# Patient Record
Sex: FEMALE | Race: WHITE | NOT HISPANIC OR LATINO | Employment: OTHER | URBAN - METROPOLITAN AREA
[De-identification: names, ages, dates, MRNs, and addresses within clinical notes are randomized per-mention and may not be internally consistent; named-entity substitution may affect disease eponyms.]

---

## 2020-03-04 ENCOUNTER — APPOINTMENT (OUTPATIENT)
Dept: RADIOLOGY | Facility: CLINIC | Age: 65
End: 2020-03-04
Payer: MEDICARE

## 2020-03-04 ENCOUNTER — OFFICE VISIT (OUTPATIENT)
Dept: OBGYN CLINIC | Facility: CLINIC | Age: 65
End: 2020-03-04
Payer: MEDICARE

## 2020-03-04 VITALS
SYSTOLIC BLOOD PRESSURE: 138 MMHG | DIASTOLIC BLOOD PRESSURE: 80 MMHG | HEART RATE: 82 BPM | BODY MASS INDEX: 31.86 KG/M2 | HEIGHT: 67 IN | WEIGHT: 203 LBS

## 2020-03-04 DIAGNOSIS — M25.561 RIGHT KNEE PAIN, UNSPECIFIED CHRONICITY: ICD-10-CM

## 2020-03-04 DIAGNOSIS — M23.91 INTERNAL DERANGEMENT OF RIGHT KNEE: Primary | ICD-10-CM

## 2020-03-04 DIAGNOSIS — M25.461 EFFUSION OF RIGHT KNEE: ICD-10-CM

## 2020-03-04 PROCEDURE — 99203 OFFICE O/P NEW LOW 30 MIN: CPT | Performed by: ORTHOPAEDIC SURGERY

## 2020-03-04 PROCEDURE — 73564 X-RAY EXAM KNEE 4 OR MORE: CPT

## 2020-03-04 RX ORDER — FOLIC ACID 0.8 MG
TABLET ORAL DAILY
COMMUNITY

## 2020-03-04 RX ORDER — GLUCOSAMINE/CHONDR SU A SOD 750-600 MG
TABLET ORAL DAILY
COMMUNITY

## 2020-03-04 NOTE — PROGRESS NOTES
Assessment:     1  Internal derangement of right knee    2  Effusion of right knee    3  Right knee pain, unspecified chronicity        Plan:     Problem List Items Addressed This Visit        Musculoskeletal and Integument    Internal derangement of right knee - Primary    Relevant Orders    MRI knee right  wo contrast    Effusion of right knee    Relevant Orders    MRI knee right  wo contrast      Other Visit Diagnoses     Right knee pain, unspecified chronicity        Relevant Orders    XR knee 4+ vw right injury          Findings consistent with right knee pain with effusion, suspect lateral meniscus tear  Discussed findings and treatment options with the patient  I reviewed patient's right knee x-ray with her  I discussed with patient possible cause of her knee pain  I am concerned the patient may have a lateral meniscus tear  I recommended MRI of her right knee to better assess the meniscus  I discussed with patient possible surgical interventions if the meniscus is torn and she continued to have mechanical symptoms  I advised patient to avoid activities that will aggravate her knee  I will see patient back after the her right knee MRI  All patient's questions were answered to her satisfaction  This note is created using dictation transcription  It may contain typographical errors, grammatical errors, improperly dictated words, background noise and other errors  Subjective:     Patient ID: Duong Ferreira is a 75 y o  female  Chief Complaint:  58-year-old female with new onset of right knee pain started 2/23/2020  Patient's symptoms started after running across parking lot to get to her car since it was raining  She denies sudden onset of pain in her right knee  She started experiencing pain in her right knee that evening  Pain got worse in the morning  She is experiencing sharp pain along the outer aspect of her knee with twisting and walking    She has difficulty walking up and down the steps   Squatting and pivoting with the knee is also painful  She denies locking or giving way sensations  She also has pain in the back of her knee that seems to radiate into her calf  She denies problem with her right knee prior to onset of her pain  Information on patient's intake form was reviewed  Allergy:  No Known Allergies  Medications:  all current active meds have been reviewed  Past Medical History:  History reviewed  No pertinent past medical history  Past Surgical History:  Past Surgical History:   Procedure Laterality Date    HYSTERECTOMY  1999     Family History:  Family History   Problem Relation Age of Onset    Diabetes Father      Social History:  Social History     Substance and Sexual Activity   Alcohol Use Not on file    Comment: rare     Social History     Substance and Sexual Activity   Drug Use Not on file     Social History     Tobacco Use   Smoking Status Never Smoker   Smokeless Tobacco Never Used     Review of Systems   Constitutional: Negative  HENT: Negative  Eyes: Negative  Respiratory: Negative  Cardiovascular: Negative  Gastrointestinal: Negative  Endocrine: Negative  Genitourinary: Negative  Musculoskeletal: Positive for arthralgias (right knee), gait problem (Antalgic) and joint swelling (right knee)  Skin: Negative  Allergic/Immunologic: Negative  Hematological: Negative  Psychiatric/Behavioral: Negative  Objective:  BP Readings from Last 1 Encounters:   03/04/20 138/80      Wt Readings from Last 1 Encounters:   03/04/20 92 1 kg (203 lb)      BMI:   Estimated body mass index is 31 79 kg/m² as calculated from the following:    Height as of this encounter: 5' 7" (1 702 m)  Weight as of this encounter: 92 1 kg (203 lb)  BSA:   Estimated body surface area is 2 04 meters squared as calculated from the following:    Height as of this encounter: 5' 7" (1 702 m)  Weight as of this encounter: 92 1 kg (203 lb)     Physical Exam Constitutional: She is oriented to person, place, and time  She appears well-developed  HENT:   Head: Normocephalic and atraumatic  Eyes: Conjunctivae and EOM are normal    Neck: Neck supple  Pulmonary/Chest: Effort normal    Musculoskeletal:        Right knee: She exhibits effusion (Grade 1)  Neurological: She is alert and oriented to person, place, and time  Skin: Skin is warm  Psychiatric: She has a normal mood and affect  Nursing note and vitals reviewed  Right Knee Exam     Muscle Strength   The patient has normal right knee strength  Tenderness   The patient is experiencing tenderness in the lateral joint line  Range of Motion   The patient has normal right knee ROM  Tests   Ana Luisa:  Medial - negative Lateral - positive  Varus: negative Valgus: negative  Patellar apprehension: negative    Other   Erythema: absent  Scars: absent  Sensation: normal  Pulse: present  Swelling: mild  Effusion: effusion (Grade 1) present            I have personally reviewed pertinent films in PACS and my interpretation is Right knee x-ray show mild degenerative changes in the patellofemoral joint  No soft tissue calcification  Good joint alignment

## 2020-03-04 NOTE — PROGRESS NOTES
Assessment:     1  Right knee pain, unspecified chronicity        Plan:   [facility]   Subjective:     Patient ID: Eveline Fried is a 75 y o  female  Chief Complaint:  75 yr old female in for evaluation of right knee pain  Allergy:  Allergies not on file  Medications:  {SL IP OUQK:132820693}  Past Medical History:  No past medical history on file  Past Surgical History:  No past surgical history on file  Family History:  No family history on file  Social History:  Social History     Substance and Sexual Activity   Alcohol Use Not on file     Social History     Substance and Sexual Activity   Drug Use Not on file     Social History     Tobacco Use   Smoking Status Not on file     Review of Systems      Objective:  BP Readings from Last 1 Encounters:   No data found for BP      Wt Readings from Last 1 Encounters:   No data found for Wt      BMI:   There is no height or weight on file to calculate BMI  BSA:   There is no height or weight on file to calculate BSA     Physical Exam  Ortho Exam    {Imaging Review Statement:[id number]}

## 2020-03-06 ENCOUNTER — HOSPITAL ENCOUNTER (OUTPATIENT)
Dept: MRI IMAGING | Facility: HOSPITAL | Age: 65
Discharge: HOME | End: 2020-03-06
Attending: ORTHOPAEDIC SURGERY
Payer: MEDICARE

## 2020-03-06 DIAGNOSIS — M25.461 EFFUSION OF RIGHT KNEE: ICD-10-CM

## 2020-03-06 DIAGNOSIS — M23.91 INTERNAL DERANGEMENT OF RIGHT KNEE: ICD-10-CM

## 2020-03-06 PROCEDURE — 73721 MRI JNT OF LWR EXTRE W/O DYE: CPT

## 2020-03-10 ENCOUNTER — PREP FOR PROCEDURE (OUTPATIENT)
Dept: OBGYN CLINIC | Facility: CLINIC | Age: 65
End: 2020-03-10

## 2020-03-10 ENCOUNTER — OFFICE VISIT (OUTPATIENT)
Dept: OBGYN CLINIC | Facility: CLINIC | Age: 65
End: 2020-03-10
Payer: MEDICARE

## 2020-03-10 ENCOUNTER — LAB (OUTPATIENT)
Dept: LAB | Facility: HOSPITAL | Age: 65
End: 2020-03-10
Attending: ORTHOPAEDIC SURGERY
Payer: MEDICARE

## 2020-03-10 VITALS
HEIGHT: 67 IN | BODY MASS INDEX: 31.86 KG/M2 | DIASTOLIC BLOOD PRESSURE: 80 MMHG | HEART RATE: 82 BPM | WEIGHT: 203 LBS | SYSTOLIC BLOOD PRESSURE: 130 MMHG

## 2020-03-10 DIAGNOSIS — S83.271A COMPLEX TEAR OF LATERAL MENISCUS OF RIGHT KNEE AS CURRENT INJURY, INITIAL ENCOUNTER: ICD-10-CM

## 2020-03-10 DIAGNOSIS — S83.271A COMPLEX TEAR OF LATERAL MENISCUS OF RIGHT KNEE AS CURRENT INJURY, INITIAL ENCOUNTER: Primary | ICD-10-CM

## 2020-03-10 PROBLEM — M23.91 INTERNAL DERANGEMENT OF RIGHT KNEE: Status: RESOLVED | Noted: 2020-03-04 | Resolved: 2020-03-10

## 2020-03-10 LAB
ANION GAP SERPL CALCULATED.3IONS-SCNC: 2 MMOL/L (ref 4–13)
APTT PPP: 31 SECONDS (ref 23–37)
ATRIAL RATE: 71 BPM
BASOPHILS # BLD AUTO: 0.06 THOUSANDS/ΜL (ref 0–0.1)
BASOPHILS NFR BLD AUTO: 1 % (ref 0–1)
BUN SERPL-MCNC: 14 MG/DL (ref 5–25)
CALCIUM SERPL-MCNC: 9 MG/DL (ref 8.3–10.1)
CHLORIDE SERPL-SCNC: 108 MMOL/L (ref 100–108)
CO2 SERPL-SCNC: 29 MMOL/L (ref 21–32)
CREAT SERPL-MCNC: 0.73 MG/DL (ref 0.6–1.3)
EOSINOPHIL # BLD AUTO: 0.24 THOUSAND/ΜL (ref 0–0.61)
EOSINOPHIL NFR BLD AUTO: 4 % (ref 0–6)
ERYTHROCYTE [DISTWIDTH] IN BLOOD BY AUTOMATED COUNT: 12.6 % (ref 11.6–15.1)
GFR SERPL CREATININE-BSD FRML MDRD: 86 ML/MIN/1.73SQ M
GLUCOSE SERPL-MCNC: 99 MG/DL (ref 65–140)
HCT VFR BLD AUTO: 40.2 % (ref 34.8–46.1)
HGB BLD-MCNC: 13.2 G/DL (ref 11.5–15.4)
IMM GRANULOCYTES # BLD AUTO: 0.01 THOUSAND/UL (ref 0–0.2)
IMM GRANULOCYTES NFR BLD AUTO: 0 % (ref 0–2)
INR PPP: 1.09 (ref 0.84–1.19)
LYMPHOCYTES # BLD AUTO: 2.47 THOUSANDS/ΜL (ref 0.6–4.47)
LYMPHOCYTES NFR BLD AUTO: 46 % (ref 14–44)
MCH RBC QN AUTO: 29.6 PG (ref 26.8–34.3)
MCHC RBC AUTO-ENTMCNC: 32.8 G/DL (ref 31.4–37.4)
MCV RBC AUTO: 90 FL (ref 82–98)
MONOCYTES # BLD AUTO: 0.67 THOUSAND/ΜL (ref 0.17–1.22)
MONOCYTES NFR BLD AUTO: 12 % (ref 4–12)
NEUTROPHILS # BLD AUTO: 1.98 THOUSANDS/ΜL (ref 1.85–7.62)
NEUTS SEG NFR BLD AUTO: 37 % (ref 43–75)
NRBC BLD AUTO-RTO: 0 /100 WBCS
P AXIS: 30 DEGREES
PLATELET # BLD AUTO: 250 THOUSANDS/UL (ref 149–390)
PMV BLD AUTO: 9.5 FL (ref 8.9–12.7)
POTASSIUM SERPL-SCNC: 4 MMOL/L (ref 3.5–5.3)
PR INTERVAL: 162 MS
PROTHROMBIN TIME: 13.7 SECONDS (ref 11.6–14.5)
QRS AXIS: 35 DEGREES
QRSD INTERVAL: 88 MS
QT INTERVAL: 402 MS
QTC INTERVAL: 436 MS
RBC # BLD AUTO: 4.46 MILLION/UL (ref 3.81–5.12)
SODIUM SERPL-SCNC: 139 MMOL/L (ref 136–145)
T WAVE AXIS: 37 DEGREES
VENTRICULAR RATE: 71 BPM
WBC # BLD AUTO: 5.43 THOUSAND/UL (ref 4.31–10.16)

## 2020-03-10 PROCEDURE — 85730 THROMBOPLASTIN TIME PARTIAL: CPT

## 2020-03-10 PROCEDURE — 85610 PROTHROMBIN TIME: CPT

## 2020-03-10 PROCEDURE — 93010 ELECTROCARDIOGRAM REPORT: CPT | Performed by: INTERNAL MEDICINE

## 2020-03-10 PROCEDURE — 80048 BASIC METABOLIC PNL TOTAL CA: CPT

## 2020-03-10 PROCEDURE — 93005 ELECTROCARDIOGRAM TRACING: CPT

## 2020-03-10 PROCEDURE — 99214 OFFICE O/P EST MOD 30 MIN: CPT | Performed by: ORTHOPAEDIC SURGERY

## 2020-03-10 PROCEDURE — 36415 COLL VENOUS BLD VENIPUNCTURE: CPT

## 2020-03-10 PROCEDURE — 85025 COMPLETE CBC W/AUTO DIFF WBC: CPT

## 2020-03-10 RX ORDER — CHLORHEXIDINE GLUCONATE 4 G/100ML
SOLUTION TOPICAL DAILY PRN
Status: CANCELLED | OUTPATIENT
Start: 2020-03-10

## 2020-03-10 RX ORDER — SODIUM CHLORIDE, SODIUM LACTATE, POTASSIUM CHLORIDE, CALCIUM CHLORIDE 600; 310; 30; 20 MG/100ML; MG/100ML; MG/100ML; MG/100ML
75 INJECTION, SOLUTION INTRAVENOUS CONTINUOUS
Status: CANCELLED | OUTPATIENT
Start: 2020-03-20

## 2020-03-10 NOTE — PROGRESS NOTES
Assessment:     1  Complex tear of lateral meniscus of right knee as current injury, initial encounter        Plan:     Problem List Items Addressed This Visit        Musculoskeletal and Integument    Complex tear of lateral meniscus of right knee as current injury - Primary     Findings consistent with right knee lateral meniscus tear  Discussed findings and treatment options with the patient  I reviewed patient's right knee MRI with her  I discussed prognosis of her knee condition  Patient continued to have mechanical symptoms with pain and giving way sensations due to pain  Recommended surgical treatment to address the mechanical symptoms in her knee  Patient agreed to proceed with the surgery  We will schedule patient as outpatient procedure  All patient's questions were answered to her satisfaction  This note is created using dictation transcription  It may contain typographical errors, grammatical errors, improperly dictated words, background noise and other errors  Discussed with patient surgical risks and complications including but not limited to infection, persistent pain, nerve and vessel injury, complications associated with anesthesia, DVT, etc          Relevant Orders    Case request operating room: ARTHROSCOPY KNEE, RIGHT PARTIAL LATERAL MENISCECTOMY (Completed)    EKG 12 lead    Basic metabolic panel    CBC and differential    APTT    Protime-INR    Crutches         Subjective:     Patient ID: Mauricio Griggs is a 75 y o  female  Chief Complaint:  76-year-old female follow-up right knee pain  Patient is here to review her right knee MRI  She continued to experiencing sharp pain around her lateral posterior aspect of the knee  Pain with walking, squatting, and stair climbing  She denies locking but has sensation of knee giving out due to pain      Allergy:  No Known Allergies  Medications:  all current active meds have been reviewed  Past Medical History:  Past Medical History: Diagnosis Date    Internal derangement of right knee 3/4/2020     Past Surgical History:  Past Surgical History:   Procedure Laterality Date    HYSTERECTOMY  1999     Family History:  Family History   Problem Relation Age of Onset    Diabetes Father      Social History:  Social History     Substance and Sexual Activity   Alcohol Use Not on file    Comment: rare     Social History     Substance and Sexual Activity   Drug Use Not on file     Social History     Tobacco Use   Smoking Status Never Smoker   Smokeless Tobacco Never Used     Review of Systems   Constitutional: Negative  HENT: Negative  Eyes: Negative  Respiratory: Negative  Cardiovascular: Negative  Gastrointestinal: Negative  Endocrine: Negative  Genitourinary: Negative  Musculoskeletal: Positive for arthralgias (Right knee) and joint swelling (Right knee)  Skin: Negative  Allergic/Immunologic: Negative  Neurological: Negative  Hematological: Negative  Psychiatric/Behavioral: Negative  Objective:  BP Readings from Last 1 Encounters:   03/10/20 130/80      Wt Readings from Last 1 Encounters:   03/10/20 92 1 kg (203 lb)      BMI:   Estimated body mass index is 31 79 kg/m² as calculated from the following:    Height as of this encounter: 5' 7" (1 702 m)  Weight as of this encounter: 92 1 kg (203 lb)  BSA:   Estimated body surface area is 2 04 meters squared as calculated from the following:    Height as of this encounter: 5' 7" (1 702 m)  Weight as of this encounter: 92 1 kg (203 lb)  Physical Exam   Constitutional: She is oriented to person, place, and time  She appears well-developed  HENT:   Head: Normocephalic and atraumatic  Eyes: Pupils are equal, round, and reactive to light  Conjunctivae and EOM are normal    Neck: Normal range of motion  Neck supple  Cardiovascular: Regular rhythm and intact distal pulses  Exam reveals no gallop  No murmur heard    Pulmonary/Chest: Breath sounds normal  She has no wheezes  She has no rales  Abdominal: Soft  Musculoskeletal:        Right knee: She exhibits effusion (Grade 1)  Neurological: She is alert and oriented to person, place, and time  Skin: Skin is warm  Psychiatric: She has a normal mood and affect  Nursing note and vitals reviewed  Right Knee Exam     Muscle Strength   The patient has normal right knee strength  Tenderness   The patient is experiencing tenderness in the lateral joint line  Range of Motion   The patient has normal right knee ROM  Tests   Ana Luisa:  Medial - negative Lateral - positive  Varus: negative Valgus: negative  Patellar apprehension: negative    Other   Erythema: absent  Sensation: normal  Pulse: present  Swelling: mild  Effusion: effusion (Grade 1) present            I have personally reviewed pertinent films in PACS and my interpretation is Right knee MRI show lateral meniscus tear in the posterior horn

## 2020-03-12 ENCOUNTER — PREP FOR PROCEDURE (OUTPATIENT)
Dept: OBGYN CLINIC | Facility: CLINIC | Age: 65
End: 2020-03-12

## 2020-03-12 ENCOUNTER — TELEPHONE (OUTPATIENT)
Dept: OBGYN CLINIC | Facility: CLINIC | Age: 65
End: 2020-03-12

## 2020-03-18 ENCOUNTER — TELEPHONE (OUTPATIENT)
Dept: OBGYN CLINIC | Facility: CLINIC | Age: 65
End: 2020-03-18

## 2020-05-07 ENCOUNTER — TELEPHONE (OUTPATIENT)
Dept: OBGYN CLINIC | Facility: CLINIC | Age: 65
End: 2020-05-07

## 2020-05-13 ENCOUNTER — OFFICE VISIT (OUTPATIENT)
Dept: OBGYN CLINIC | Facility: CLINIC | Age: 65
End: 2020-05-13
Payer: MEDICARE

## 2020-05-13 DIAGNOSIS — S83.271D COMPLEX TEAR OF LATERAL MENISCUS OF RIGHT KNEE AS CURRENT INJURY, SUBSEQUENT ENCOUNTER: Primary | ICD-10-CM

## 2020-05-13 PROCEDURE — 99215 OFFICE O/P EST HI 40 MIN: CPT | Performed by: ORTHOPAEDIC SURGERY

## 2020-05-13 RX ORDER — CHLORHEXIDINE GLUCONATE 4 G/100ML
SOLUTION TOPICAL DAILY PRN
Status: CANCELLED | OUTPATIENT
Start: 2020-05-13

## 2020-05-16 DIAGNOSIS — S83.271D COMPLEX TEAR OF LATERAL MENISCUS OF RIGHT KNEE AS CURRENT INJURY, SUBSEQUENT ENCOUNTER: ICD-10-CM

## 2020-05-16 PROCEDURE — U0003 INFECTIOUS AGENT DETECTION BY NUCLEIC ACID (DNA OR RNA); SEVERE ACUTE RESPIRATORY SYNDROME CORONAVIRUS 2 (SARS-COV-2) (CORONAVIRUS DISEASE [COVID-19]), AMPLIFIED PROBE TECHNIQUE, MAKING USE OF HIGH THROUGHPUT TECHNOLOGIES AS DESCRIBED BY CMS-2020-01-R: HCPCS

## 2020-05-18 ENCOUNTER — APPOINTMENT (OUTPATIENT)
Dept: LAB | Facility: HOSPITAL | Age: 65
End: 2020-05-18
Payer: MEDICARE

## 2020-05-18 ENCOUNTER — APPOINTMENT (OUTPATIENT)
Dept: PREADMISSION TESTING | Facility: HOSPITAL | Age: 65
End: 2020-05-18
Payer: MEDICARE

## 2020-05-18 DIAGNOSIS — S83.271D COMPLEX TEAR OF LATERAL MENISCUS OF RIGHT KNEE AS CURRENT INJURY, SUBSEQUENT ENCOUNTER: ICD-10-CM

## 2020-05-18 LAB
ALBUMIN SERPL BCP-MCNC: 3.8 G/DL (ref 3.5–5)
ALP SERPL-CCNC: 51 U/L (ref 46–116)
ALT SERPL W P-5'-P-CCNC: 28 U/L (ref 12–78)
ANION GAP SERPL CALCULATED.3IONS-SCNC: 2 MMOL/L (ref 4–13)
AST SERPL W P-5'-P-CCNC: 21 U/L (ref 5–45)
BASOPHILS # BLD AUTO: 0.05 THOUSANDS/ΜL (ref 0–0.1)
BASOPHILS NFR BLD AUTO: 1 % (ref 0–1)
BILIRUB SERPL-MCNC: 0.43 MG/DL (ref 0.2–1)
BUN SERPL-MCNC: 11 MG/DL (ref 5–25)
CALCIUM SERPL-MCNC: 8.9 MG/DL (ref 8.3–10.1)
CHLORIDE SERPL-SCNC: 107 MMOL/L (ref 100–108)
CO2 SERPL-SCNC: 30 MMOL/L (ref 21–32)
CREAT SERPL-MCNC: 0.67 MG/DL (ref 0.6–1.3)
EOSINOPHIL # BLD AUTO: 0.22 THOUSAND/ΜL (ref 0–0.61)
EOSINOPHIL NFR BLD AUTO: 4 % (ref 0–6)
ERYTHROCYTE [DISTWIDTH] IN BLOOD BY AUTOMATED COUNT: 12.6 % (ref 11.6–15.1)
GFR SERPL CREATININE-BSD FRML MDRD: 92 ML/MIN/1.73SQ M
GLUCOSE P FAST SERPL-MCNC: 95 MG/DL (ref 65–99)
HCT VFR BLD AUTO: 40.4 % (ref 34.8–46.1)
HGB BLD-MCNC: 13.4 G/DL (ref 11.5–15.4)
IMM GRANULOCYTES # BLD AUTO: 0.01 THOUSAND/UL (ref 0–0.2)
IMM GRANULOCYTES NFR BLD AUTO: 0 % (ref 0–2)
LYMPHOCYTES # BLD AUTO: 2.03 THOUSANDS/ΜL (ref 0.6–4.47)
LYMPHOCYTES NFR BLD AUTO: 40 % (ref 14–44)
MCH RBC QN AUTO: 29.5 PG (ref 26.8–34.3)
MCHC RBC AUTO-ENTMCNC: 33.2 G/DL (ref 31.4–37.4)
MCV RBC AUTO: 89 FL (ref 82–98)
MONOCYTES # BLD AUTO: 0.54 THOUSAND/ΜL (ref 0.17–1.22)
MONOCYTES NFR BLD AUTO: 11 % (ref 4–12)
NEUTROPHILS # BLD AUTO: 2.17 THOUSANDS/ΜL (ref 1.85–7.62)
NEUTS SEG NFR BLD AUTO: 44 % (ref 43–75)
NRBC BLD AUTO-RTO: 0 /100 WBCS
PLATELET # BLD AUTO: 239 THOUSANDS/UL (ref 149–390)
PMV BLD AUTO: 9.6 FL (ref 8.9–12.7)
POTASSIUM SERPL-SCNC: 3.8 MMOL/L (ref 3.5–5.3)
PROT SERPL-MCNC: 7.6 G/DL (ref 6.4–8.2)
RBC # BLD AUTO: 4.54 MILLION/UL (ref 3.81–5.12)
SODIUM SERPL-SCNC: 139 MMOL/L (ref 136–145)
WBC # BLD AUTO: 5.02 THOUSAND/UL (ref 4.31–10.16)

## 2020-05-18 PROCEDURE — 85025 COMPLETE CBC W/AUTO DIFF WBC: CPT

## 2020-05-18 PROCEDURE — 36415 COLL VENOUS BLD VENIPUNCTURE: CPT

## 2020-05-18 PROCEDURE — 80053 COMPREHEN METABOLIC PANEL: CPT

## 2020-05-19 LAB — SARS-COV-2 RNA SPEC QL NAA+PROBE: NOT DETECTED

## 2020-05-22 ENCOUNTER — ANESTHESIA EVENT (OUTPATIENT)
Dept: PERIOP | Facility: HOSPITAL | Age: 65
End: 2020-05-22
Payer: MEDICARE

## 2020-05-22 ENCOUNTER — HOSPITAL ENCOUNTER (OUTPATIENT)
Facility: HOSPITAL | Age: 65
Setting detail: OUTPATIENT SURGERY
Discharge: HOME | End: 2020-05-22
Attending: ORTHOPAEDIC SURGERY | Admitting: ORTHOPAEDIC SURGERY
Payer: MEDICARE

## 2020-05-22 ENCOUNTER — ANESTHESIA (OUTPATIENT)
Dept: PERIOP | Facility: HOSPITAL | Age: 65
End: 2020-05-22
Payer: MEDICARE

## 2020-05-22 VITALS
HEART RATE: 63 BPM | BODY MASS INDEX: 31.04 KG/M2 | SYSTOLIC BLOOD PRESSURE: 147 MMHG | TEMPERATURE: 97.5 F | RESPIRATION RATE: 16 BRPM | DIASTOLIC BLOOD PRESSURE: 81 MMHG | HEIGHT: 67 IN | OXYGEN SATURATION: 98 % | WEIGHT: 197.8 LBS

## 2020-05-22 DIAGNOSIS — S83.271D COMPLEX TEAR OF LATERAL MENISCUS OF RIGHT KNEE AS CURRENT INJURY, SUBSEQUENT ENCOUNTER: Primary | ICD-10-CM

## 2020-05-22 PROCEDURE — 29881 ARTHRS KNE SRG MNISECTMY M/L: CPT | Performed by: PHYSICIAN ASSISTANT

## 2020-05-22 PROCEDURE — 29881 ARTHRS KNE SRG MNISECTMY M/L: CPT | Performed by: ORTHOPAEDIC SURGERY

## 2020-05-22 PROCEDURE — NC001 PR NO CHARGE: Performed by: PHYSICIAN ASSISTANT

## 2020-05-22 RX ORDER — CEFAZOLIN SODIUM 1 G/50ML
1000 SOLUTION INTRAVENOUS ONCE
Status: COMPLETED | OUTPATIENT
Start: 2020-05-22 | End: 2020-05-22

## 2020-05-22 RX ORDER — MIDAZOLAM HYDROCHLORIDE 2 MG/2ML
INJECTION, SOLUTION INTRAMUSCULAR; INTRAVENOUS AS NEEDED
Status: DISCONTINUED | OUTPATIENT
Start: 2020-05-22 | End: 2020-05-22 | Stop reason: SURG

## 2020-05-22 RX ORDER — BUPIVACAINE HYDROCHLORIDE AND EPINEPHRINE 5; 5 MG/ML; UG/ML
INJECTION, SOLUTION EPIDURAL; INTRACAUDAL; PERINEURAL AS NEEDED
Status: DISCONTINUED | OUTPATIENT
Start: 2020-05-22 | End: 2020-05-22 | Stop reason: HOSPADM

## 2020-05-22 RX ORDER — HYDROMORPHONE HCL/PF 1 MG/ML
0.5 SYRINGE (ML) INJECTION
Status: DISCONTINUED | OUTPATIENT
Start: 2020-05-22 | End: 2020-05-22 | Stop reason: HOSPADM

## 2020-05-22 RX ORDER — ONDANSETRON 2 MG/ML
INJECTION INTRAMUSCULAR; INTRAVENOUS AS NEEDED
Status: DISCONTINUED | OUTPATIENT
Start: 2020-05-22 | End: 2020-05-22 | Stop reason: SURG

## 2020-05-22 RX ORDER — ONDANSETRON 2 MG/ML
4 INJECTION INTRAMUSCULAR; INTRAVENOUS ONCE
Status: DISCONTINUED | OUTPATIENT
Start: 2020-05-22 | End: 2020-05-22 | Stop reason: HOSPADM

## 2020-05-22 RX ORDER — OXYCODONE HYDROCHLORIDE AND ACETAMINOPHEN 5; 325 MG/1; MG/1
1 TABLET ORAL EVERY 4 HOURS PRN
Status: DISCONTINUED | OUTPATIENT
Start: 2020-05-22 | End: 2020-05-22 | Stop reason: HOSPADM

## 2020-05-22 RX ORDER — FENTANYL CITRATE 50 UG/ML
INJECTION, SOLUTION INTRAMUSCULAR; INTRAVENOUS AS NEEDED
Status: DISCONTINUED | OUTPATIENT
Start: 2020-05-22 | End: 2020-05-22 | Stop reason: SURG

## 2020-05-22 RX ORDER — ACETAMINOPHEN 325 MG/1
650 TABLET ORAL EVERY 6 HOURS PRN
Status: DISCONTINUED | OUTPATIENT
Start: 2020-05-22 | End: 2020-05-22 | Stop reason: HOSPADM

## 2020-05-22 RX ORDER — FENTANYL CITRATE/PF 50 MCG/ML
25 SYRINGE (ML) INJECTION
Status: DISCONTINUED | OUTPATIENT
Start: 2020-05-22 | End: 2020-05-22 | Stop reason: HOSPADM

## 2020-05-22 RX ORDER — SODIUM CHLORIDE, SODIUM LACTATE, POTASSIUM CHLORIDE, CALCIUM CHLORIDE 600; 310; 30; 20 MG/100ML; MG/100ML; MG/100ML; MG/100ML
75 INJECTION, SOLUTION INTRAVENOUS CONTINUOUS
Status: DISCONTINUED | OUTPATIENT
Start: 2020-05-22 | End: 2020-05-22 | Stop reason: HOSPADM

## 2020-05-22 RX ORDER — OXYCODONE HYDROCHLORIDE AND ACETAMINOPHEN 5; 325 MG/1; MG/1
1 TABLET ORAL EVERY 4 HOURS PRN
Qty: 20 TABLET | Refills: 0 | Status: SHIPPED | OUTPATIENT
Start: 2020-05-22 | End: 2020-06-01

## 2020-05-22 RX ORDER — DEXAMETHASONE SODIUM PHOSPHATE 10 MG/ML
INJECTION, SOLUTION INTRAMUSCULAR; INTRAVENOUS AS NEEDED
Status: DISCONTINUED | OUTPATIENT
Start: 2020-05-22 | End: 2020-05-22 | Stop reason: SURG

## 2020-05-22 RX ORDER — CHLORHEXIDINE GLUCONATE 4 G/100ML
SOLUTION TOPICAL DAILY PRN
Status: DISCONTINUED | OUTPATIENT
Start: 2020-05-22 | End: 2020-05-22 | Stop reason: HOSPADM

## 2020-05-22 RX ORDER — METOCLOPRAMIDE HYDROCHLORIDE 5 MG/ML
10 INJECTION INTRAMUSCULAR; INTRAVENOUS ONCE
Status: DISCONTINUED | OUTPATIENT
Start: 2020-05-22 | End: 2020-05-22 | Stop reason: HOSPADM

## 2020-05-22 RX ORDER — KETOROLAC TROMETHAMINE 30 MG/ML
INJECTION, SOLUTION INTRAMUSCULAR; INTRAVENOUS AS NEEDED
Status: DISCONTINUED | OUTPATIENT
Start: 2020-05-22 | End: 2020-05-22 | Stop reason: SURG

## 2020-05-22 RX ORDER — PROPOFOL 10 MG/ML
INJECTION, EMULSION INTRAVENOUS AS NEEDED
Status: DISCONTINUED | OUTPATIENT
Start: 2020-05-22 | End: 2020-05-22 | Stop reason: SURG

## 2020-05-22 RX ADMIN — KETOROLAC TROMETHAMINE 30 MG: 30 INJECTION, SOLUTION INTRAMUSCULAR; INTRAVENOUS at 09:46

## 2020-05-22 RX ADMIN — FENTANYL CITRATE 25 MCG: 50 INJECTION, SOLUTION INTRAMUSCULAR; INTRAVENOUS at 09:28

## 2020-05-22 RX ADMIN — MIDAZOLAM 2 MG: 1 INJECTION INTRAMUSCULAR; INTRAVENOUS at 09:11

## 2020-05-22 RX ADMIN — PROPOFOL 200 MG: 10 INJECTION, EMULSION INTRAVENOUS at 09:17

## 2020-05-22 RX ADMIN — FENTANYL CITRATE 50 MCG: 50 INJECTION, SOLUTION INTRAMUSCULAR; INTRAVENOUS at 09:11

## 2020-05-22 RX ADMIN — CEFAZOLIN SODIUM 1000 MG: 1 SOLUTION INTRAVENOUS at 09:11

## 2020-05-22 RX ADMIN — DEXAMETHASONE SODIUM PHOSPHATE 4 MG: 10 INJECTION, SOLUTION INTRAMUSCULAR; INTRAVENOUS at 09:28

## 2020-05-22 RX ADMIN — FENTANYL CITRATE 25 MCG: 50 INJECTION, SOLUTION INTRAMUSCULAR; INTRAVENOUS at 09:32

## 2020-05-22 RX ADMIN — SODIUM CHLORIDE, SODIUM LACTATE, POTASSIUM CHLORIDE, AND CALCIUM CHLORIDE 75 ML/HR: .6; .31; .03; .02 INJECTION, SOLUTION INTRAVENOUS at 08:08

## 2020-05-22 RX ADMIN — ONDANSETRON 4 MG: 2 INJECTION INTRAMUSCULAR; INTRAVENOUS at 09:32

## 2020-06-02 ENCOUNTER — OFFICE VISIT (OUTPATIENT)
Dept: OBGYN CLINIC | Facility: CLINIC | Age: 65
End: 2020-06-02

## 2020-06-02 VITALS
DIASTOLIC BLOOD PRESSURE: 87 MMHG | BODY MASS INDEX: 30.76 KG/M2 | HEART RATE: 65 BPM | SYSTOLIC BLOOD PRESSURE: 156 MMHG | HEIGHT: 67 IN | WEIGHT: 196 LBS

## 2020-06-02 DIAGNOSIS — Z47.89 AFTERCARE FOLLOWING SURGERY OF THE MUSCULOSKELETAL SYSTEM: Primary | ICD-10-CM

## 2020-06-02 PROCEDURE — 99024 POSTOP FOLLOW-UP VISIT: CPT | Performed by: ORTHOPAEDIC SURGERY

## 2020-06-17 DIAGNOSIS — Z47.89 AFTERCARE FOLLOWING SURGERY OF THE MUSCULOSKELETAL SYSTEM: Primary | ICD-10-CM

## 2020-06-24 ENCOUNTER — EVALUATION (OUTPATIENT)
Dept: PHYSICAL THERAPY | Facility: CLINIC | Age: 65
End: 2020-06-24
Payer: MEDICARE

## 2020-06-24 DIAGNOSIS — Z47.89 AFTERCARE FOLLOWING SURGERY OF THE MUSCULOSKELETAL SYSTEM: Primary | ICD-10-CM

## 2020-06-24 PROCEDURE — 97110 THERAPEUTIC EXERCISES: CPT | Performed by: PHYSICAL THERAPIST

## 2020-06-24 PROCEDURE — 97162 PT EVAL MOD COMPLEX 30 MIN: CPT | Performed by: PHYSICAL THERAPIST

## 2020-06-29 ENCOUNTER — OFFICE VISIT (OUTPATIENT)
Dept: PHYSICAL THERAPY | Facility: CLINIC | Age: 65
End: 2020-06-29
Payer: MEDICARE

## 2020-06-29 DIAGNOSIS — Z47.89 AFTERCARE FOLLOWING SURGERY OF THE MUSCULOSKELETAL SYSTEM: Primary | ICD-10-CM

## 2020-06-29 PROCEDURE — 97140 MANUAL THERAPY 1/> REGIONS: CPT

## 2020-06-29 PROCEDURE — 97112 NEUROMUSCULAR REEDUCATION: CPT

## 2020-06-29 PROCEDURE — 97110 THERAPEUTIC EXERCISES: CPT

## 2020-06-30 ENCOUNTER — OFFICE VISIT (OUTPATIENT)
Dept: OBGYN CLINIC | Facility: CLINIC | Age: 65
End: 2020-06-30

## 2020-06-30 VITALS
BODY MASS INDEX: 31.39 KG/M2 | HEIGHT: 67 IN | WEIGHT: 200 LBS | DIASTOLIC BLOOD PRESSURE: 80 MMHG | SYSTOLIC BLOOD PRESSURE: 122 MMHG | TEMPERATURE: 97.4 F

## 2020-06-30 DIAGNOSIS — Z47.89 AFTERCARE FOLLOWING SURGERY OF THE MUSCULOSKELETAL SYSTEM: Primary | ICD-10-CM

## 2020-06-30 PROCEDURE — 99024 POSTOP FOLLOW-UP VISIT: CPT | Performed by: ORTHOPAEDIC SURGERY

## 2020-07-01 ENCOUNTER — OFFICE VISIT (OUTPATIENT)
Dept: PHYSICAL THERAPY | Facility: CLINIC | Age: 65
End: 2020-07-01
Payer: MEDICARE

## 2020-07-01 DIAGNOSIS — Z47.89 AFTERCARE FOLLOWING SURGERY OF THE MUSCULOSKELETAL SYSTEM: Primary | ICD-10-CM

## 2020-07-01 PROCEDURE — 97110 THERAPEUTIC EXERCISES: CPT

## 2020-07-01 PROCEDURE — 97140 MANUAL THERAPY 1/> REGIONS: CPT

## 2020-07-01 PROCEDURE — 97112 NEUROMUSCULAR REEDUCATION: CPT

## 2020-07-01 NOTE — PROGRESS NOTES
Daily Note     Today's date: 2020  Patient name: Randy Estrella  : 1955  MRN: [id number]  Referring provider: Alma Langley MD  Dx:   Encounter Diagnosis     ICD-10-CM    1  Aftercare following surgery of the musculoskeletal system Z47 89                   Subjective: Pt  Reported good tolerance to LV  She had some minimal residual soreness which lasted less than 24 hours  She is feeling good today and reports HEP is going well  Objective: See treatment diary below      Assessment:   Stage: subacute (DOS: 20)  Stability of Symptoms: evolving - improving  Symptom Irritability Level: moderate  Primary Movement Diagnosis: poor motor control   Goal(s):   STG to be met in 3 weeks (7/15/20):  - Increase (R) Knee AROM: 0-125 degrees  - Increase (R) LE strength by 1/2 MMT grade  - Improve SL balance to 10 seconds  - I with HEP  LTG to be met in 6 weeks (20):  - Increase (R) Knee AROM: 0-135 degrees  - Increase (R) LE strength to 4+/5 all planes  - Improve SL balance to 20 seconds  - I with updated HEP   - Patient will be able to return to walking over 2 miles without increased pain  - Patient will be able to negotiate a full flight of steps without increased pain  - Patient will return to gym-based exercise program    Greatest Concern: not back to normal   Current Activity Recommendations: added HEP ()  Current Educational Needs: progressions    Pt  Continues to progress well  She was able to perform all TE's today with minimal discomfort except for transition from full extension for a period of time to flexion  She reported steps maintain most challenging so 4" step up/down was initiated today to improve this  Tolerated Overall  treatment well  Patient exhibited good technique with therapeutic exercises and would benefit from continued PT      Plan: Continue per plan of care  Progress treatment as tolerated  Monitor tolerance to progressed treatment            Daily Treatment Diary DX: s/p (R) knee menisectomy  EPOC: 8/5/20  Precautions: standard  CO-MORBIDITIES: NA        Manual  6/29 7/1       (R) Knee mobs         (R) Knee PROM 8 min 8 min                                      Exercise Diary  HEP 6/29 7/1       Therapeutic Exercise           Recumbent Bike  5 min 5 min                 Quad Set 6/24 5"x10 5"x15       SLR 6/24 5"x10 ea 5"x15 ea       S/L Hip ABD 6/24 5"x10 ea 5"x15 ea       PHE 6/24 5"x10 ea 5"x15 ea       Bridge  5"x10 5"x10                 LAQ  5"x10 ea 5"x15 ea                           Neuromuscular Reeducation          SL Balance   15"x3                 Std Hip Flex  x10 ea 15x ea       Std Hip ABD  x10 ea 15x ea       Std Hip Ext  x10 ea 15x ea       HS Curls  x10 ea 15x ea                 FWD Mini Lunge   10 ea       LAT Mini Lunge   10 ea       Mini Squat   10                 Therapeutic Activity                              Gait Training                        Modalities

## 2020-07-06 ENCOUNTER — OFFICE VISIT (OUTPATIENT)
Dept: PHYSICAL THERAPY | Facility: CLINIC | Age: 65
End: 2020-07-06
Payer: MEDICARE

## 2020-07-06 DIAGNOSIS — Z47.89 AFTERCARE FOLLOWING SURGERY OF THE MUSCULOSKELETAL SYSTEM: Primary | ICD-10-CM

## 2020-07-06 PROCEDURE — 97140 MANUAL THERAPY 1/> REGIONS: CPT

## 2020-07-06 PROCEDURE — 97112 NEUROMUSCULAR REEDUCATION: CPT

## 2020-07-06 PROCEDURE — 97110 THERAPEUTIC EXERCISES: CPT

## 2020-07-06 NOTE — PROGRESS NOTES
Daily Note     Today's date: 2020  Patient name: Rommel Villegas  : 1955  MRN: [id number]  Referring provider: Helen Bond MD  Dx:   Encounter Diagnosis     ICD-10-CM    1  Aftercare following surgery of the musculoskeletal system Z47 89                   Subjective: Pt  Reported good tolerance to LV  Pt  Continues to report some increased soreness after therapy but does not last long, and knee feels better after soreness resolves  She has still been doing steps one at a time  Objective: See treatment diary below      Assessment:   Stage: subacute (DOS: 20)  Stability of Symptoms: evolving - improving  Symptom Irritability Level: moderate  Primary Movement Diagnosis: poor motor control   Goal(s):   STG to be met in 3 weeks (7/15/20):  - Increase (R) Knee AROM: 0-125 degrees  - Increase (R) LE strength by 1/2 MMT grade  - Improve SL balance to 10 seconds  - I with HEP  LTG to be met in 6 weeks (20):  - Increase (R) Knee AROM: 0-135 degrees  - Increase (R) LE strength to 4+/5 all planes  - Improve SL balance to 20 seconds  - I with updated HEP   - Patient will be able to return to walking over 2 miles without increased pain  - Patient will be able to negotiate a full flight of steps without increased pain  - Patient will return to gym-based exercise program    Greatest Concern: not back to normal   Current Activity Recommendations: added HEP ()  Current Educational Needs: progressions    Pt  Continues to progress well  Steps remain most challenging- Improvement noted on 4" step downs with no discomfort- will continue to progress height  All TE's tolerated well with some progressions with added resistance  Tolerated Overall  treatment well  Patient exhibited good technique with therapeutic exercises and would benefit from continued PT      Plan: Continue per plan of care  Progress treatment as tolerated  Monitor tolerance to progressed treatment            Daily Treatment Diary     DX: s/p (R) knee menisectomy  EPOC: 8/5/20  Precautions: standard  CO-MORBIDITIES: NA        Manual  6/29 7/1 7/6      (R) Knee mobs         (R) Knee PROM 8 min 8 min 8 min                                     Exercise Diary  HEP 6/29 7/1 7/6      Therapeutic Exercise           Recumbent Bike  5 min 5 min 5 min                Quad Set 6/24 5"x10 5"x15 Heel  Elevated  5"x10      SLR 6/24 5"x10 ea 5"x15 ea 1 5# x15 ea      S/L Hip ABD 6/24 5"x10 ea 5"x15 ea 1 5# x15 ea      PHE 6/24 5"x10 ea 5"x15 ea 1 5# x15 ea      Bridge  5"x10 5"x10 5"x15                LAQ  5"x10 ea 5"x15 ea 1 5# x15 ea                          Neuromuscular Reeducation          SL Balance   15"x3 20"x3 ea                Std Hip Flex  x10 ea 15x ea 15x ea      Std Hip ABD  x10 ea 15x ea 15x ea      Std Hip Ext  x10 ea 15x ea 15x ea      HS Curls  x10 ea 15x ea 15x ea                FWD Mini Lunge   10 ea 10 ea      LAT Mini Lunge   10 ea 10 ea      Mini Squat   10 10                Fwd Step ups    4"x10      Lat Step ups    4"x10      Step downs    4"x10                Therapeutic Activity                              Gait Training                        Modalities

## 2020-07-08 ENCOUNTER — OFFICE VISIT (OUTPATIENT)
Dept: PHYSICAL THERAPY | Facility: CLINIC | Age: 65
End: 2020-07-08
Payer: MEDICARE

## 2020-07-08 DIAGNOSIS — Z47.89 AFTERCARE FOLLOWING SURGERY OF THE MUSCULOSKELETAL SYSTEM: Primary | ICD-10-CM

## 2020-07-08 PROCEDURE — 97112 NEUROMUSCULAR REEDUCATION: CPT | Performed by: PHYSICAL THERAPIST

## 2020-07-08 PROCEDURE — 97110 THERAPEUTIC EXERCISES: CPT | Performed by: PHYSICAL THERAPIST

## 2020-07-08 PROCEDURE — 97140 MANUAL THERAPY 1/> REGIONS: CPT | Performed by: PHYSICAL THERAPIST

## 2020-07-08 NOTE — PROGRESS NOTES
Daily Note     Today's date: 2020  Patient name: Rommel Villegas  : 1955  MRN: [id number]  Referring provider: Helen Bond MD  Dx:   Encounter Diagnosis     ICD-10-CM    1  Aftercare following surgery of the musculoskeletal system Z47 89                   Subjective: Patient reports slight increase in posterior knee soreness following last visit  She notes she was fatigued with overall exercise program   She reports today she is feeling a little better  Objective: See treatment diary below      Assessment:   Stage: subacute (DOS: 20)  Stability of Symptoms: evolving - improving  Symptom Irritability Level: moderate  Primary Movement Diagnosis: poor motor control   Goal(s):   STG to be met in 3 weeks (7/15/20):  - Increase (R) Knee AROM: 0-125 degrees  - Increase (R) LE strength by 1/2 MMT grade  - Improve SL balance to 10 seconds  - I with HEP  LTG to be met in 6 weeks (20):  - Increase (R) Knee AROM: 0-135 degrees  - Increase (R) LE strength to 4+/5 all planes  - Improve SL balance to 20 seconds  - I with updated HEP   - Patient will be able to return to walking over 2 miles without increased pain  - Patient will be able to negotiate a full flight of steps without increased pain  - Patient will return to gym-based exercise program    Greatest Concern: not back to normal   Current Activity Recommendations: added HEP ()  Current Educational Needs: progressions    Patient tolerated manual treatment well  Treatment was modified by removing weights for mat-based exercises  She had no complaints of increased pain with lunges  Form was modified with squats - focus on hip hinge to avoid excessive anterior tibial translation  Patient reported no pain post treatment  Plan: Continue with POC - monitor tolerance to treatment            Daily Treatment Diary     DX: s/p (R) knee menisectomy  EPOC: 20  Precautions: standard  CO-MORBIDITIES: NA        Manual   (R) Knee mobs    Seated traction  3 min     (R) Knee PROM 8 min 8 min 8 min 5 min                                    Exercise Diary  HEP 6/29 7/1 7/6 7/8     Therapeutic Exercise           Recumbent Bike  5 min 5 min 5 min 6 min               Quad Set 6/24 5"x10 5"x15 Heel  Elevated  5"x10      SLR 6/24 5"x10 ea 5"x15 ea 1 5# x15 ea 5"x15 ea     S/L Hip ABD 6/24 5"x10 ea 5"x15 ea 1 5# x15 ea 5"x15     PHE 6/24 5"x10 ea 5"x15 ea 1 5# x15 ea 5"x15     Bridge  5"x10 5"x10 5"x15 5"x15               LAQ  5"x10 ea 5"x15 ea 1 5# x15 ea 1 5# x15 ea                         Neuromuscular Reeducation          SL Balance   15"x3 20"x3 ea                Std Hip Flex  x10 ea 15x ea 15x ea 15x ea     Std Hip ABD  x10 ea 15x ea 15x ea 15x ea     Std Hip Ext  x10 ea 15x ea 15x ea 15x ea     HS Curls  x10 ea 15x ea 15x ea 15x ea               FWD Mini Lunge   10 ea 10 ea 10x ea     LAT Mini Lunge   10 ea 10 ea 10x ea     Mini Squat   10 10 10               Fwd Step ups    4"x10 4"x10     Lat Step ups    4"x10 4"x10     Step downs    4"x10 4"x10               Therapeutic Activity                              Gait Training                        Modalities

## 2020-07-13 ENCOUNTER — OFFICE VISIT (OUTPATIENT)
Dept: PHYSICAL THERAPY | Facility: CLINIC | Age: 65
End: 2020-07-13
Payer: MEDICARE

## 2020-07-13 DIAGNOSIS — Z47.89 AFTERCARE FOLLOWING SURGERY OF THE MUSCULOSKELETAL SYSTEM: Primary | ICD-10-CM

## 2020-07-13 PROCEDURE — 97112 NEUROMUSCULAR REEDUCATION: CPT

## 2020-07-13 PROCEDURE — 97140 MANUAL THERAPY 1/> REGIONS: CPT

## 2020-07-13 PROCEDURE — 97110 THERAPEUTIC EXERCISES: CPT

## 2020-07-13 NOTE — PROGRESS NOTES
Daily Note     Today's date: 2020  Patient name: Lizette Shine  : 1955  MRN: [id number]  Referring provider: Gerri Villa MD  Dx:   Encounter Diagnosis     ICD-10-CM    1  Aftercare following surgery of the musculoskeletal system Z47 89                   Subjective: Patient reports good tolerance to her LV, still mild soreness which resolves in less than 24 hours  Still having soreness after completion of HEP as well but some slight improvement in length of lasting soreness after noted  Today she had already completed HEP so a little sore upon start of session  Objective: See treatment diary below      Assessment:   Stage: subacute (DOS: 20)  Stability of Symptoms: evolving - improving  Symptom Irritability Level: moderate  Primary Movement Diagnosis: poor motor control   Goal(s):   STG to be met in 3 weeks (7/15/20):  - Increase (R) Knee AROM: 0-125 degrees  - Increase (R) LE strength by 1/2 MMT grade  - Improve SL balance to 10 seconds  - I with HEP  LTG to be met in 6 weeks (20):  - Increase (R) Knee AROM: 0-135 degrees  - Increase (R) LE strength to 4+/5 all planes  - Improve SL balance to 20 seconds  - I with updated HEP   - Patient will be able to return to walking over 2 miles without increased pain  - Patient will be able to negotiate a full flight of steps without increased pain  - Patient will return to gym-based exercise program    Greatest Concern: not back to normal   Current Activity Recommendations: added HEP ()  Current Educational Needs: progressions    Patient continues to benefit from manual therapy, knee flexion still painful at first but improves with continued stretching  Continues to tolerate TE's well, with no adverse effects but fatigues quickly  Incorporated weight back into supine TE's with no trouble, but will monitor her response  Improvement noted with step downs at a 6" height         Plan: Continue with POC - monitor tolerance to treatment and added resistance  Progress as able            Daily Treatment Diary     DX: s/p (R) knee menisectomy  EPOC: 8/5/20  Precautions: standard  CO-MORBIDITIES: NA        Manual  6/29 7/1 7/6 7/8 7/13    (R) Knee mobs    Seated traction  3 min Seated traction  3 min    (R) Knee PROM 8 min 8 min 8 min 5 min 5 min                                   Exercise Diary  HEP 6/29 7/1 7/6 7/8 7/13    Therapeutic Exercise           Recumbent Bike  5 min 5 min 5 min 6 min 6 min              Quad Set 6/24 5"x10 5"x15 Heel  Elevated  5"x10      SLR 6/24 5"x10 ea 5"x15 ea 1 5# x15 ea 5"x15 ea 1 5# x10 ea    S/L Hip ABD 6/24 5"x10 ea 5"x15 ea 1 5# x15 ea 5"x15 1 5# x10 ea    PHE 6/24 5"x10 ea 5"x15 ea 1 5# x15 ea 5"x15 1 5# x10 ea    Bridge  5"x10 5"x10 5"x15 5"x15 5"x15              LAQ  5"x10 ea 5"x15 ea 1 5# x15 ea 1 5# x15 ea 2# x10 ea                        Neuromuscular Reeducation          SL Balance   15"x3 20"x3 ea  20"x3 ea              Std Hip Flex  x10 ea 15x ea 15x ea 15x ea 2#x20 ea    Std Hip ABD  x10 ea 15x ea 15x ea 15x ea 2#x20 ea    Std Hip Ext  x10 ea 15x ea 15x ea 15x ea 2#x20 ea    HS Curls  x10 ea 15x ea 15x ea 15x ea 0#x20 ea              FWD Mini Lunge   10 ea 10 ea 10x ea 10x ea    LAT Mini Lunge   10 ea 10 ea 10x ea 10x ea    Mini Squat   10 10 10 10x              Fwd Step ups    4"x10 4"x10 6"x10    Lat Step ups    4"x10 4"x10 6"x10    Step downs    4"x10 4"x10 6"x10              Therapeutic Activity                              Gait Training                        Modalities

## 2020-07-15 ENCOUNTER — OFFICE VISIT (OUTPATIENT)
Dept: PHYSICAL THERAPY | Facility: CLINIC | Age: 65
End: 2020-07-15
Payer: MEDICARE

## 2020-07-15 DIAGNOSIS — Z47.89 AFTERCARE FOLLOWING SURGERY OF THE MUSCULOSKELETAL SYSTEM: Primary | ICD-10-CM

## 2020-07-15 PROCEDURE — 97140 MANUAL THERAPY 1/> REGIONS: CPT

## 2020-07-15 PROCEDURE — 97112 NEUROMUSCULAR REEDUCATION: CPT

## 2020-07-15 PROCEDURE — 97110 THERAPEUTIC EXERCISES: CPT

## 2020-07-15 NOTE — PROGRESS NOTES
Daily Note     Today's date: 7/15/2020  Patient name: Patrica Eldridge  : 1955  MRN: [id number]  Referring provider: Puma Maradiaga MD  Dx:   Encounter Diagnosis     ICD-10-CM    1  Aftercare following surgery of the musculoskeletal system Z47 89                   Subjective: Patient reports good tolerance to her LV  She reports she has a very busy day yesterday but rested in between each chore and iced her knee, and had no adverse effects  She reports she is feeling great today and feels she could do it all again today  Objective: See treatment diary below      Assessment:   Stage: subacute (DOS: 20)  Stability of Symptoms: evolving - improving  Symptom Irritability Level: moderate  Primary Movement Diagnosis: poor motor control   Goal(s):   STG to be met in 3 weeks (7/15/20):  - Increase (R) Knee AROM: 0-125 degrees  - Increase (R) LE strength by 1/2 MMT grade  - Improve SL balance to 10 seconds  - I with HEP  LTG to be met in 6 weeks (20):  - Increase (R) Knee AROM: 0-135 degrees  - Increase (R) LE strength to 4+/5 all planes  - Improve SL balance to 20 seconds  - I with updated HEP   - Patient will be able to return to walking over 2 miles without increased pain  - Patient will be able to negotiate a full flight of steps without increased pain  - Patient will return to gym-based exercise program    Greatest Concern: not back to normal   Current Activity Recommendations: added HEP ()  Current Educational Needs: progressions    Patient continues to benefit from manual therapy, much improved without discomfort in full flexion and extension  She had been having difficulty with transition from ext to flx when in ext for a period of time  Worked on slow controled heel slides without a strap to help improve that transition  Also improved tolerance to TE's and step downs without use of UE assistance        Plan: Continue with POC - monitor tolerance to progressions         Daily Treatment Diary     DX: s/p (R) knee menisectomy  EPOC: 8/5/20  Precautions: standard  CO-MORBIDITIES: NA        Manual  6/29 7/1 7/6 7/8 7/13 7/15   (R) Knee mobs    Seated traction  3 min Seated traction  3 min Seated traction  3 min   (R) Knee PROM 8 min 8 min 8 min 5 min 5 min 5 min                                  Exercise Diary  HEP 6/29 7/1 7/6 7/8 7/13 7/15   Therapeutic Exercise           Recumbent Bike  5 min 5 min 5 min 6 min 6 min 6 min             Quad Set 6/24 5"x10 5"x15 Heel  Elevated  5"x10      Heel slides       5"x10   SLR 6/24 5"x10 ea 5"x15 ea 1 5# x15 ea 5"x15 ea 1 5# x10 ea 2#  x10 ea   S/L Hip ABD 6/24 5"x10 ea 5"x15 ea 1 5# x15 ea 5"x15 1 5# x10 ea 2#  x10 ea   PHE 6/24 5"x10 ea 5"x15 ea 1 5# x15 ea 5"x15 1 5# x10 ea 2#  x10 ea   Bridge  5"x10 5"x10 5"x15 5"x15 5"x15              LAQ  5"x10 ea 5"x15 ea 1 5# x15 ea 1 5# x15 ea 2# x10 ea 2#   x20 ea                       Neuromuscular Reeducation          SL Balance   15"x3 20"x3 ea  20"x3 ea 20"x3 ea             Std Hip Flex  x10 ea 15x ea 15x ea 15x ea 2#x20 ea 2#x20 ea   Std Hip ABD  x10 ea 15x ea 15x ea 15x ea 2#x20 ea 2#x20 ea   Std Hip Ext  x10 ea 15x ea 15x ea 15x ea 2#x20 ea 2#x20 ea   HS Curls  x10 ea 15x ea 15x ea 15x ea 0#x20 ea 0#x20 ea             FWD Mini Lunge   10 ea 10 ea 10x ea 10x ea 10x ea   LAT Mini Lunge   10 ea 10 ea 10x ea 10x ea 10x ea   Mini Squat   10 10 10 10x 10x             Fwd Step ups    4"x10 4"x10 6"x10 6"x10   Lat Step ups    4"x10 4"x10 6"x10 6"x10   Step downs    4"x10 4"x10 6"x10 6"x10             Therapeutic Activity                              Gait Training                        Modalities

## 2020-07-20 ENCOUNTER — OFFICE VISIT (OUTPATIENT)
Dept: PHYSICAL THERAPY | Facility: CLINIC | Age: 65
End: 2020-07-20
Payer: MEDICARE

## 2020-07-20 DIAGNOSIS — Z47.89 AFTERCARE FOLLOWING SURGERY OF THE MUSCULOSKELETAL SYSTEM: Primary | ICD-10-CM

## 2020-07-20 PROCEDURE — 97112 NEUROMUSCULAR REEDUCATION: CPT | Performed by: PHYSICAL THERAPIST

## 2020-07-20 PROCEDURE — 97140 MANUAL THERAPY 1/> REGIONS: CPT | Performed by: PHYSICAL THERAPIST

## 2020-07-20 PROCEDURE — 97110 THERAPEUTIC EXERCISES: CPT | Performed by: PHYSICAL THERAPIST

## 2020-07-20 NOTE — PROGRESS NOTES
Daily Note     Today's date: 2020  Patient name: Fidela Apley  : 1955  MRN: [id number]  Referring provider: Carlin Pereyra MD  Dx:   Encounter Diagnosis     ICD-10-CM    1  Aftercare following surgery of the musculoskeletal system Z47 89                   Subjective: Patient reports good tolerance to her LV  She notes she has been performing her HEP regularly  She reports with cleaning her vehicle recently she noted some increased discomfort  Objective: See treatment diary below      Assessment:   Stage: subacute (DOS: 20)  Stability of Symptoms: evolving - improving  Symptom Irritability Level: moderate  Primary Movement Diagnosis: poor motor control   Goal(s):   STG to be met in 3 weeks (7/15/20):  - Increase (R) Knee AROM: 0-125 degrees  - Increase (R) LE strength by 1/2 MMT grade  - Improve SL balance to 10 seconds  - I with HEP  LTG to be met in 6 weeks (20):  - Increase (R) Knee AROM: 0-135 degrees  - Increase (R) LE strength to 4+/5 all planes  - Improve SL balance to 20 seconds  - I with updated HEP   - Patient will be able to return to walking over 2 miles without increased pain  - Patient will be able to negotiate a full flight of steps without increased pain  - Patient will return to gym-based exercise program    Greatest Concern: not back to normal   Current Activity Recommendations: added HEP ()  Current Educational Needs: progressions    Patient continues to respond well to manual treatment - improved knee mobility following  She was able to progress with resistance and repetitions  She displayed good form throughout treatment  She had no complaints of increased pain post treatment         Plan: Continue with POC - monitor tolerance to progressions         Daily Treatment Diary     DX: s/p (R) knee menisectomy  EPOC: 20  Precautions: standard  CO-MORBIDITIES: NA        Manual  7/13 7/15 7/20      (R) Knee mobs Seated traction  3 min Seated traction  3 min Seated traction  3 min      (R) Knee PROM 5 min 5 min 5 min                                     Exercise Diary  HEP 7/13 7/15 7/20      Therapeutic Exercise           Recumbent Bike  6 min 6 min 6 min                Quad Set 6/24         Heel slides   5"x10       SLR 6/24 1 5# x10 ea 2#  x10 ea 2# 2x10 ea      S/L Hip ABD 6/24 1 5# x10 ea 2#  x10 ea 2# 2x10 ea      PHE 6/24 1 5# x10 ea 2#  x10 ea 2# 2x10 ea      Bridge  5"x15  5"x20                LAQ  2# x10 ea 2#   x20 ea 4# x20 ea                          Neuromuscular Reeducation          SL Balance  20"x3 ea 20"x3 ea                 Std Hip Flex  2#x20 ea 2#x20 ea 2# x20 ea      Std Hip ABD  2#x20 ea 2#x20 ea 2# x20 ea      Std Hip Ext  2#x20 ea 2#x20 ea 2# x20 ea      HS Curls  0#x20 ea 0#x20 ea 2# x20 ea                FWD Mini Lunge  10x ea 10x ea 10x ea      LAT Mini Lunge  10x ea 10x ea 10x ea      Mini Squat  10x 10x 15x                Fwd Step ups  6"x10 6"x10 6"x15      Lat Step ups  6"x10 6"x10 6"x15      Step downs  6"x10 6"x10 6"x15                Therapeutic Activity                              Gait Training                        Modalities

## 2020-07-22 ENCOUNTER — OFFICE VISIT (OUTPATIENT)
Dept: PHYSICAL THERAPY | Facility: CLINIC | Age: 65
End: 2020-07-22
Payer: MEDICARE

## 2020-07-22 DIAGNOSIS — Z47.89 AFTERCARE FOLLOWING SURGERY OF THE MUSCULOSKELETAL SYSTEM: Primary | ICD-10-CM

## 2020-07-22 PROCEDURE — 97140 MANUAL THERAPY 1/> REGIONS: CPT

## 2020-07-22 PROCEDURE — 97112 NEUROMUSCULAR REEDUCATION: CPT

## 2020-07-22 PROCEDURE — 97110 THERAPEUTIC EXERCISES: CPT

## 2020-07-22 NOTE — PROGRESS NOTES
Daily Note     Today's date: 2020  Patient name: Sylvia Machuca  : 1955  MRN: [id number]  Referring provider: Lamberto Zapata MD  Dx:   Encounter Diagnosis     ICD-10-CM    1  Aftercare following surgery of the musculoskeletal system Z47 89                   Subjective: Patient reports good tolerance to her LV  She reports continued soreness at times but that she is also doing more around the house now that she's feeling better  She also reports she feels her knee is becoming a lot straighter  She notes consistency with HEP  Objective: See treatment diary below      Assessment:   Stage: subacute (DOS: 20)  Stability of Symptoms: evolving - improving  Symptom Irritability Level: moderate  Primary Movement Diagnosis: poor motor control   Goal(s):   STG to be met in 3 weeks (7/15/20):  - Increase (R) Knee AROM: 0-125 degrees  - Increase (R) LE strength by 1/2 MMT grade  - Improve SL balance to 10 seconds  - I with HEP  LTG to be met in 6 weeks (20):  - Increase (R) Knee AROM: 0-135 degrees  - Increase (R) LE strength to 4+/5 all planes  - Improve SL balance to 20 seconds  - I with updated HEP   - Patient will be able to return to walking over 2 miles without increased pain  - Patient will be able to negotiate a full flight of steps without increased pain  - Patient will return to gym-based exercise program    Greatest Concern: not back to normal   Current Activity Recommendations: added HEP ()  Current Educational Needs: progressions    Patient continues to respond well to manual treatment - improved knee mobility post  She continues to tolerate progressed TE's with no adverse effects  Improvement noted with performance of TE's and no discomfort  8" step down still challenging for pt  But smooth transition with 6" down  Will continue to work up to 8"   Also specified difficulty standing from toilet so sit to stand from multiple height was incorporated to work into lower height from toilet  Plan: Continue with POC - monitor tolerance to progressions  Progress to lower sit to stand height            Daily Treatment Diary     DX: s/p (R) knee menisectomy  EPOC: 8/5/20  Precautions: standard  CO-MORBIDITIES: NA        Manual  7/13 7/15 7/20 7/22     (R) Knee mobs Seated traction  3 min Seated traction  3 min Seated traction  3 min Seated traction  3 min     (R) Knee PROM 5 min 5 min 5 min 5 min                                    Exercise Diary  HEP 7/13 7/15 7/20 7/22     Therapeutic Exercise           Recumbent Bike  6 min 6 min 6 min 6 min               Quad Set 6/24         Heel slides   5"x10       SLR 6/24 1 5# x10 ea 2#  x10 ea 2# 2x10 ea 2 5#  10x2 ea     S/L Hip ABD 6/24 1 5# x10 ea 2#  x10 ea 2# 2x10 ea 2 5#  10x2 ea     PHE 6/24 1 5# x10 ea 2#  x10 ea 2# 2x10 ea 2 5#  10x2 ea     Bridge  5"x15  5"x20 5"x20               LAQ  2# x10 ea 2#   x20 ea 4# x20 ea 4# x20 ea               Sit to stand     High/low 21" x10     Neuromuscular Reeducation          SL Balance  20"x3 ea 20"x3 ea                 Std Hip Flex  2#x20 ea 2#x20 ea 2# x20 ea 2 5# x20 ea     Std Hip ABD  2#x20 ea 2#x20 ea 2# x20 ea 2 5# x20 ea     Std Hip Ext  2#x20 ea 2#x20 ea 2# x20 ea 2 5# x20 ea     HS Curls  0#x20 ea 0#x20 ea 2# x20 ea 2 5# x20 ea               FWD Mini Lunge  10x ea 10x ea 10x ea 10x ea     LAT Mini Lunge  10x ea 10x ea 10x ea 10x ea     Mini Squat  10x 10x 15x 15x               Fwd Step ups  6"x10 6"x10 6"x15 8"x20     Lat Step ups  6"x10 6"x10 6"x15 8"x20     Step downs  6"x10 6"x10 6"x15 6"x20               Therapeutic Activity                              Gait Training                        Modalities

## 2020-07-27 ENCOUNTER — OFFICE VISIT (OUTPATIENT)
Dept: PHYSICAL THERAPY | Facility: CLINIC | Age: 65
End: 2020-07-27
Payer: MEDICARE

## 2020-07-27 DIAGNOSIS — Z47.89 AFTERCARE FOLLOWING SURGERY OF THE MUSCULOSKELETAL SYSTEM: Primary | ICD-10-CM

## 2020-07-27 PROCEDURE — 97140 MANUAL THERAPY 1/> REGIONS: CPT

## 2020-07-27 PROCEDURE — 97112 NEUROMUSCULAR REEDUCATION: CPT

## 2020-07-27 PROCEDURE — 97110 THERAPEUTIC EXERCISES: CPT

## 2020-07-27 NOTE — PROGRESS NOTES
Daily Note     Today's date: 2020  Patient name: Madhu Shine  : 1955  MRN: [id number]  Referring provider: Margie Fagan MD  Dx:   Encounter Diagnosis     ICD-10-CM    1  Aftercare following surgery of the musculoskeletal system Z47 89                   Subjective: Patient reports good tolerance to her LV  She reports she still has trouble standing from toilet but has noticed improvement with getting in/out of her low sitting car  She reports HEP is going well    Objective: See treatment diary below      Assessment:   Stage: subacute (DOS: 20)  Stability of Symptoms: evolving - improving  Symptom Irritability Level: moderate  Primary Movement Diagnosis: poor motor control   Goal(s):   STG to be met in 3 weeks (7/15/20):  - Increase (R) Knee AROM: 0-125 degrees  - Increase (R) LE strength by 1/2 MMT grade  - Improve SL balance to 10 seconds  - I with HEP  LTG to be met in 6 weeks (20):  - Increase (R) Knee AROM: 0-135 degrees  - Increase (R) LE strength to 4+/5 all planes  - Improve SL balance to 20 seconds  - I with updated HEP   - Patient will be able to return to walking over 2 miles without increased pain  - Patient will be able to negotiate a full flight of steps without increased pain  - Patient will return to gym-based exercise program    Greatest Concern: not back to normal   Current Activity Recommendations: added HEP ()  Current Educational Needs: progressions    Patient continues to respond well to manual treatment - improved knee mobility post  She continues to tolerate progressed TE's with no adverse effects  Continued to work on sit to stand from lower height with good tolerance-will continue to lower height each time for remaining few visits  Plan: Continue with POC - monitor tolerance to progressions  Progress to lower sit to stand height   Progress HEP NV         Daily Treatment Diary     DX: s/p (R) knee menisectomy  EPOC: 20  Precautions: standard  CO-MORBIDITIES: NA        Manual  7/13 7/15 7/20 7/22 7/27    (R) Knee mobs Seated traction  3 min Seated traction  3 min Seated traction  3 min Seated traction  3 min Seated traction  3 min    (R) Knee PROM 5 min 5 min 5 min 5 min 5 min                                   Exercise Diary  HEP 7/13 7/15 7/20 7/22 7/27    Therapeutic Exercise           Recumbent Bike  6 min 6 min 6 min 6 min 6 min              Quad Set 6/24         Heel slides   5"x10       SLR 6/24 1 5# x10 ea 2#  x10 ea 2# 2x10 ea 2 5#  10x2 ea 2 5#  10x2 ea    S/L Hip ABD 6/24 1 5# x10 ea 2#  x10 ea 2# 2x10 ea 2 5#  10x2 ea 2 5#  10x2 ea    PHE 6/24 1 5# x10 ea 2#  x10 ea 2# 2x10 ea 2 5#  10x2 ea 2 5#  10x2 ea    Bridge  5"x15  5"x20 5"x20 5"x20 w   SD              LAQ  2# x10 ea 2#   x20 ea 4# x20 ea 4# x20 ea 4# x20 ea              Sit to stand     High/low 21" x10 High/low 20" x10    Neuromuscular Reeducation          SL Balance  20"x3 ea 20"x3 ea                 Std Hip Flex  2#x20 ea 2#x20 ea 2# x20 ea 2 5# x20 ea 2 5# x20 ea    Std Hip ABD  2#x20 ea 2#x20 ea 2# x20 ea 2 5# x20 ea 2 5# x20 ea    Std Hip Ext  2#x20 ea 2#x20 ea 2# x20 ea 2 5# x20 ea 2 5# x20 ea    HS Curls  0#x20 ea 0#x20 ea 2# x20 ea 2 5# x20 ea 2 5# x20 ea              FWD Mini Lunge  10x ea 10x ea 10x ea 10x ea 10x ea    LAT Mini Lunge  10x ea 10x ea 10x ea 10x ea 10x ea    Mini Squat  10x 10x 15x 15x 15              Fwd Step ups  6"x10 6"x10 6"x15 8"x20 8"x20    Lat Step ups  6"x10 6"x10 6"x15 8"x20 8"x20    Step downs  6"x10 6"x10 6"x15 6"x20 6"x20              Therapeutic Activity                              Gait Training                        Modalities

## 2020-07-29 ENCOUNTER — OFFICE VISIT (OUTPATIENT)
Dept: PHYSICAL THERAPY | Facility: CLINIC | Age: 65
End: 2020-07-29
Payer: MEDICARE

## 2020-07-29 DIAGNOSIS — Z47.89 AFTERCARE FOLLOWING SURGERY OF THE MUSCULOSKELETAL SYSTEM: Primary | ICD-10-CM

## 2020-07-29 PROCEDURE — 97112 NEUROMUSCULAR REEDUCATION: CPT

## 2020-07-29 PROCEDURE — 97140 MANUAL THERAPY 1/> REGIONS: CPT

## 2020-07-29 PROCEDURE — 97110 THERAPEUTIC EXERCISES: CPT

## 2020-07-29 NOTE — PROGRESS NOTES
Daily Note     Today's date: 2020  Patient name: Brittany Rodriguez  : 1955  MRN: [id number]  Referring provider: Jackelyn Bolden MD  Dx:   Encounter Diagnosis     ICD-10-CM    1  Aftercare following surgery of the musculoskeletal system Z47 89                   Subjective: Patient reports good tolerance to her LV  She reports biggest challenges still standing from toilet and transition from fully flexed to fully extended, and getting out of her [place]  She reports consistency with HEP  Objective: See treatment diary below      Assessment:   Stage: subacute (DOS: 20)  Stability of Symptoms: evolving - improving  Symptom Irritability Level: moderate  Primary Movement Diagnosis: poor motor control   Goal(s):   STG to be met in 3 weeks (7/15/20):  - Increase (R) Knee AROM: 0-125 degrees  - Increase (R) LE strength by 1/2 MMT grade  - Improve SL balance to 10 seconds  - I with HEP  LTG to be met in 6 weeks (20):  - Increase (R) Knee AROM: 0-135 degrees  - Increase (R) LE strength to 4+/5 all planes  - Improve SL balance to 20 seconds  - I with updated HEP   - Patient will be able to return to walking over 2 miles without increased pain  - Patient will be able to negotiate a full flight of steps without increased pain  - Patient will return to gym-based exercise program    Greatest Concern: not back to normal   Current Activity Recommendations: added HEP ()  Current Educational Needs: progressions    Patient continues to respond well to manual treatment - improved knee mobility post  She continues to tolerate progressed resistance to TE's with no adverse effects  Reviewed HEP which pt  Reports she is doing all exercises that are performed during her therapy sessions, but would like updated pictures  Continued to work on sit to stand from lower height with good tolerance-will continue to lower height each time for remaining few visits      Plan: Continue with POC - monitor tolerance to progressions  Progress to lower sit to stand height  Progress HEP NV         Daily Treatment Diary     DX: s/p (R) knee menisectomy  EPOC: 8/5/20  Precautions: standard  CO-MORBIDITIES: NA        Manual  7/13 7/15 7/20 7/22 7/27 7/29   (R) Knee mobs Seated traction  3 min Seated traction  3 min Seated traction  3 min Seated traction  3 min Seated traction  3 min Seated traction  3 min   (R) Knee PROM 5 min 5 min 5 min 5 min 5 min 5 min                                  Exercise Diary  HEP 7/13 7/15 7/20 7/22 7/27 7/29   Therapeutic Exercise           Recumbent Bike  6 min 6 min 6 min 6 min 6 min 6 min             Quad Set 6/24         Heel slides   5"x10       SLR 6/24 1 5# x10 ea 2#  x10 ea 2# 2x10 ea 2 5#  10x2 ea 2 5#  10x2 ea 3#  10x2 ea   S/L Hip ABD 6/24 1 5# x10 ea 2#  x10 ea 2# 2x10 ea 2 5#  10x2 ea 2 5#  10x2 ea 3#  10x2 ea   PHE 6/24 1 5# x10 ea 2#  x10 ea 2# 2x10 ea 2 5#  10x2 ea 2 5#  10x2 ea 3#  10x2 ea   Bridge  5"x15  5"x20 5"x20 5"x20 w  DC 5"x20 w  DC             LAQ  2# x10 ea 2#   x20 ea 4# x20 ea 4# x20 ea 4# x20 ea 5# x20 ea             Sit to stand     High/low 21" x10 High/low 20" x10 High/low 18" x10   Neuromuscular Reeducation          SL Balance  20"x3 ea 20"x3 ea                 Std Hip Flex 7/29 2#x20 ea 2#x20 ea 2# x20 ea 2 5# x20 ea 2 5# x20 ea 3# x20 ea   Std Hip ABD 7/29 2#x20 ea 2#x20 ea 2# x20 ea 2 5# x20 ea 2 5# x20 ea 3# x20 ea   Std Hip Ext 7/29 2#x20 ea 2#x20 ea 2# x20 ea 2 5# x20 ea 2 5# x20 ea 3# x20 ea   HS Curls 7/29 0#x20 ea 0#x20 ea 2# x20 ea 2 5# x20 ea 2 5# x20 ea 3# x20 ea             FWD Mini Lunge 7/29 10x ea 10x ea 10x ea 10x ea 10x ea 20x ea   LAT Mini Lunge 7/29 10x ea 10x ea 10x ea 10x ea 10x ea 20x ea   Mini Squat 7/29 10x 10x 15x 15x 15 20             Fwd Step ups  6"x10 6"x10 6"x15 8"x20 8"x20 10"x10   Lat Step ups  6"x10 6"x10 6"x15 8"x20 8"x20 10"x10   Step downs  6"x10 6"x10 6"x15 6"x20 6"x20 6"x20   Fwd/Lat tap downs       nv?              Therapeutic Activity                              Gait Training                        Modalities

## 2020-08-03 ENCOUNTER — OFFICE VISIT (OUTPATIENT)
Dept: PHYSICAL THERAPY | Facility: CLINIC | Age: 65
End: 2020-08-03
Payer: MEDICARE

## 2020-08-03 DIAGNOSIS — Z47.89 AFTERCARE FOLLOWING SURGERY OF THE MUSCULOSKELETAL SYSTEM: Primary | ICD-10-CM

## 2020-08-03 PROCEDURE — 97112 NEUROMUSCULAR REEDUCATION: CPT

## 2020-08-03 PROCEDURE — 97140 MANUAL THERAPY 1/> REGIONS: CPT

## 2020-08-03 PROCEDURE — 97110 THERAPEUTIC EXERCISES: CPT

## 2020-08-03 NOTE — PROGRESS NOTES
Daily Note     Today's date: 8/3/2020  Patient name: Ayla Sullivan  : 1955  MRN: [id number]  Referring provider: Marisa Jewell MD  Dx:   Encounter Diagnosis     ICD-10-CM    1  Aftercare following surgery of the musculoskeletal system  Z47 89                   Subjective: Patient reports good tolerance to her LV  She reports biggest challenges still standing from toilet and transition from fully flexed to fully extended, and getting out of her [place]  She reports consistency with HEP  Objective: See treatment diary below      Assessment:   Stage: subacute (DOS: 20)  Stability of Symptoms: evolving - improving  Symptom Irritability Level: moderate  Primary Movement Diagnosis: poor motor control   Goal(s):   STG to be met in 3 weeks (7/15/20):  - Increase (R) Knee AROM: 0-125 degrees  - Increase (R) LE strength by 1/2 MMT grade  - Improve SL balance to 10 seconds  - I with HEP  LTG to be met in 6 weeks (20):  - Increase (R) Knee AROM: 0-135 degrees  - Increase (R) LE strength to 4+/5 all planes  - Improve SL balance to 20 seconds  - I with updated HEP   - Patient will be able to return to walking over 2 miles without increased pain  - Patient will be able to negotiate a full flight of steps without increased pain  - Patient will return to gym-based exercise program    Greatest Concern: not back to normal   Current Activity Recommendations: added HEP ()  Current Educational Needs: progressions    Patient continues to progress well  Continues to respond well to exercises and manual stretching  Continued to tolerate progressions with no adverse effects  Continues to improve sit to stand from lower heights to improve one of her greatest complaints at this time of standing from toilet  Also noted improvement with transition from fully flexed to extending the knee, and extension to fully flexing  Plan: Continue with POC - monitor tolerance to progressions  Prepare pt   For transition to independent HEP and d/c  Daily Treatment Diary     DX: s/p (R) knee menisectomy  EPOC: 8/5/20  Precautions: standard  CO-MORBIDITIES: NA        Manual  7/29 8/3       (R) Knee mobs Seated traction  3 min Seated traction  3 min       (R) Knee PROM 5 min 5 min                                      Exercise Diary  HEP 7/29 8/3       Therapeutic Exercise           Recumbent Bike  6 min 6 min                 Quad Set 6/24         Heel slides          SLR 6/24 3#  10x2 ea 3#  10x2 ea       S/L Hip ABD 6/24 3#  10x2 ea 3#  10x2 ea       PHE 6/24 3#  10x2 ea 3#  10x2 ea       Bridge  5"x20 w  VA 5"x20 w   VA                 LAQ  5# x20 ea 5# x20 ea                 Sit to stand  High/low 18" x10 High/low 17" x10       Neuromuscular Reeducation          SL Balance                    Std Hip Flex 7/29 3# x20 ea        Std Hip ABD 7/29 3# x20 ea        Std Hip Ext 7/29 3# x20 ea        HS Curls 7/29 3# x20 ea                  FWD Mini Lunge 7/29 20x ea 20 ea       LAT Mini Lunge 7/29 20x ea 20 ea       Mini Squat 7/29 20 20                 Fwd Step ups  10"x10        Lat Step ups  10"x10        Step downs  6"x20 6"x10       Fwd/Lat tap downs  nv? 4"x10 ea                 Therapeutic Activity                              Gait Training                        Modalities

## 2020-08-05 ENCOUNTER — EVALUATION (OUTPATIENT)
Dept: PHYSICAL THERAPY | Facility: CLINIC | Age: 65
End: 2020-08-05
Payer: MEDICARE

## 2020-08-05 DIAGNOSIS — Z47.89 AFTERCARE FOLLOWING SURGERY OF THE MUSCULOSKELETAL SYSTEM: Primary | ICD-10-CM

## 2020-08-05 PROCEDURE — 97110 THERAPEUTIC EXERCISES: CPT | Performed by: PHYSICAL THERAPIST

## 2020-08-05 NOTE — PROGRESS NOTES
PT Re-Evaluation     Today's date: 2020  Patient name: Carlos Cha  : 1955  MRN: [id number]  Referring provider: Fatuma Buckner MD  Dx:   Encounter Diagnosis     ICD-10-CM    1  Aftercare following surgery of the musculoskeletal system  [id number]               Assessment  Assessment details: Patient has attended 13 PT treatment sessions from 20 to 20  Since initial evaluation patient displays with objective improvements with pain levels, strength, ROM, and function  Patient has made excellent progress towards her goals  She continues to have pain at times with a lot of activity  Her endurance is improving  At this time it is recommended that she continue with an I HEP  She is to contact me if she has return of symptoms or any questions/ concerns  Please contact me if you have any questions  Thank you for the opportunity to share in the care of this patient       Impairments: abnormal gait, abnormal muscle tone, abnormal or restricted ROM, abnormal movement, activity intolerance, impaired balance, impaired physical strength, lacks appropriate home exercise program, pain with function and poor body mechanics  Understanding of Dx/Px/POC: good   Prognosis: good  Prognosis details: Positive prognostic indicators include: positive attitude toward recovery  Negative prognostic indicators include time since injury  Goals  STG to be met in 3 weeks (7/15/20):  - Increase (R) Knee AROM: 0-125 degrees  - partially met  - Increase (R) LE strength by 1/2 MMT grade  - met  - Improve SL balance to 10 seconds  - met  - I with HEP  - met  LTG to be met in 6 weeks (20):  - Increase (R) Knee AROM: 0-135 degrees  - Increase (R) LE strength to 4+/5 all planes  - Improve SL balance to 20 seconds  - I with updated HEP   - Patient will be able to return to walking over 2 miles without increased pain  - met  - Patient will be able to negotiate a full flight of steps without increased pain   - met  - Patient will return to gym-based exercise program   - met      Plan  Plan details: DC to I HEP  Patient would benefit from: skilled physical therapy  Planned modality interventions: cryotherapy and thermotherapy: hydrocollator packs  Planned therapy interventions: joint mobilization, manual therapy, neuromuscular re-education, patient education, strengthening, stretching, therapeutic activities, therapeutic exercise, home exercise program, body mechanics training, activity modification, functional ROM exercises, gait training and balance  Treatment plan discussed with: patient and PTA        Subjective Evaluation    History of Present Illness  Date of surgery: 5/22/2020  Mechanism of injury: surgery  Mechanism of injury: At Evaluation (6/24/20): Patient describes having (R) knee pain about 3 years ago - treated successfully with PT  She reports in February she ran across a parking to avoid the rain  Later that night she had increased pain  She describes a gradual progression of increased pain and decreased function - particularly with stair negotiation and ambulation  She saw Dr Inessa Alvarez - x-rays and MRI were performed  She was scheduled for knee surgery - delayed due to COVID-19 pandemic  She underwent (R) lateral menisectomy surgery on May 22, 2020  She has been performing gentle exercises  She notes she has had increased lateral knee pain - PT was recommended  Red Flag Screen:  Patient denies recent fever, changes in bowel and bladder function, nausea and vomiting, weakness, unexplained weight loss, tingling, numbness, or recent traumatic JETHRO       Greatest concern: afraid about not getting back to normal - gym exercises and walking longer distances    Currently (8/5/20): Patient reports since starting PT she sees significant improvement in pain levels, strength, ROM, and function  She reports she has been able to go up and down a full flight of steps and walked 2 miles    She is performing her HEP regularly  Quality of life: good    Pain  Current pain ratin  At best pain ratin  At worst pain ratin  Location: (R) Knee  Quality: pulling, dull ache and sharp    Social Support  Steps to enter house: yes  Stairs in house: yes   Lives with: alone    Employment status: not working    Diagnostic Tests  X-ray: abnormal  MRI studies: abnormal  Patient Goals  Patient goal: Return to regular exercise program, walk two miles without increased pain, negotiate full flight of steps without increased pain - met        Objective     Static Posture   General Observations  Symmetrical weight bearing  Knee   Knee (Right): Flexed  Palpation     Additional Palpation Details  TTP (R) knee lateral joint line and posterior knee    Active Range of Motion     Right Hip   Flexion: WFL  Abduction: [facility] [place]  External rotation (90/90): [employer] [place]  Internal rotation (90/90): WFL  Left Knee   Flexion: 140 degrees   Extension: 0 degrees     Right Knee   Flexion: 130 degrees   Extension: -1 degrees     Mobility   Patellar Mobility:   Left Knee   WFL: medial, lateral, superior and inferior  Right Knee   WFL: medial, lateral, superior and inferior    Strength/Myotome Testing     Left Hip   Planes of Motion   Extension: 4+  Abduction: 4+    Right Hip   Planes of Motion   Extension: 4+  Abduction: 4    Left Knee   Flexion: 5  Extension: 5    Right Knee   Flexion: 4+  Extension: 5    Tests     Additional Tests Details  Additional special testing not performed secondary to post op status      Ambulation     Observational Gait   Walking speed, left step length and right step length within functional limits     Left foot contact pattern: heel to toe  Right foot contact pattern: heel to toe  Left arm swing: decreased  Right arm swing: decreased  Base of support: increased    Functional Assessment        Comments  Sit to stand: slight weight shifted (L)  SL Balance:    - EO: (R) >10 sec, Ankle strategy; (L) >10 sec, Ankle strategy Daily Treatment Diary     DX: s/p (R) knee menisectomy  EPOC: 8/5/20  Precautions: standard  CO-MORBIDITIES: NA    Stage: subacute (DOS: 5/22/20)  Stability of Symptoms: evolving - improving  Symptom Irritability Level: moderate  Primary Movement Diagnosis: poor motor control   Goal(s):   STG to be met in 3 weeks (7/15/20):  - Increase (R) Knee AROM: 0-125 degrees  - Increase (R) LE strength by 1/2 MMT grade  - Improve SL balance to 10 seconds  - I with HEP  LTG to be met in 6 weeks (8/5/20):  - Increase (R) Knee AROM: 0-135 degrees  - Increase (R) LE strength to 4+/5 all planes  - Improve SL balance to 20 seconds  - I with updated HEP   - Patient will be able to return to walking over 2 miles without increased pain  - Patient will be able to negotiate a full flight of steps without increased pain  - Patient will return to gym-based exercise program    Greatest Concern: not back to normal   Current Activity Recommendations: added HEP (6/24)  Current Educational Needs: progressions        Manual  7/29 8/3       (R) Knee mobs Seated traction  3 min Seated traction  3 min       (R) Knee PROM 5 min 5 min                                      Exercise Diary  HEP 7/29 8/3 8/5      Therapeutic Exercise           Recumbent Bike  6 min 6 min 6 min                Quad Set 6/24         Heel slides          SLR 6/24 3#  10x2 ea 3#  10x2 ea       S/L Hip ABD 6/24 3#  10x2 ea 3#  10x2 ea       PHE 6/24 3#  10x2 ea 3#  10x2 ea       Bridge  5"x20 w  NH 5"x20 w   NH                 LAQ  5# x20 ea 5# x20 ea                 Sit to stand  High/low 18" x10 High/low 17" x10       Neuromuscular Reeducation          SL Balance                    Std Hip Flex 7/29 3# x20 ea        Std Hip ABD 7/29 3# x20 ea        Std Hip Ext 7/29 3# x20 ea        HS Curls 7/29 3# x20 ea                  FWD Mini Lunge 7/29 20x ea 20 ea       LAT Mini Lunge 7/29 20x ea 20 ea       Mini Squat 7/29 20 20                 Fwd Step ups 10"x10        Lat Step ups  10"x10        Step downs  6"x20 6"x10       Fwd/Lat tap downs  nv? 4"x10 ea                 Therapeutic Activity                              Gait Training                        Modalities

## 2020-08-11 ENCOUNTER — OFFICE VISIT (OUTPATIENT)
Dept: OBGYN CLINIC | Facility: CLINIC | Age: 65
End: 2020-08-11

## 2020-08-11 VITALS
DIASTOLIC BLOOD PRESSURE: 70 MMHG | WEIGHT: 200 LBS | HEIGHT: 67 IN | TEMPERATURE: 96.6 F | BODY MASS INDEX: 31.39 KG/M2 | SYSTOLIC BLOOD PRESSURE: 124 MMHG

## 2020-08-11 DIAGNOSIS — Z47.89 AFTERCARE FOLLOWING SURGERY OF THE MUSCULOSKELETAL SYSTEM: Primary | ICD-10-CM

## 2020-08-11 PROBLEM — M25.461 EFFUSION OF RIGHT KNEE: Status: RESOLVED | Noted: 2020-03-04 | Resolved: 2020-08-11

## 2020-08-11 PROBLEM — S83.271A COMPLEX TEAR OF LATERAL MENISCUS OF RIGHT KNEE AS CURRENT INJURY: Status: RESOLVED | Noted: 2020-03-10 | Resolved: 2020-08-11

## 2020-08-11 PROCEDURE — 99024 POSTOP FOLLOW-UP VISIT: CPT | Performed by: ORTHOPAEDIC SURGERY

## 2020-08-11 NOTE — ASSESSMENT & PLAN NOTE
S/p right knee arthroscopic partial lateral meniscectomy 5/22/20, old pain is gone, still has some mild discomfort with activity  This could be from arthritis in knee causing some residual pain  She is to continue with low impact exercises, stationary bike, elliptical, swimming for exercise  She can use OTC NSAIDs as needed for pain  If her knee continues to feel ache in nature call and can give cortisone injection  All patient's questions were answered to her satisfaction  This note is created using dictation transcription  It may contain typographical errors, grammatical errors, improperly dictated words, background noise and other errors

## 2020-08-11 NOTE — PROGRESS NOTES
Assessment:     1  Aftercare following surgery of the musculoskeletal system        Plan:     Problem List Items Addressed This Visit        Other    Aftercare following surgery of the musculoskeletal system - Primary     S/p right knee arthroscopic partial lateral meniscectomy 5/22/20, old pain is gone, still has some mild discomfort with activity  This could be from arthritis in knee causing some residual pain  She is to continue with low impact exercises, stationary bike, elliptical, swimming for exercise  She can use OTC NSAIDs as needed for pain  If her knee continues to feel ache in nature call and can give cortisone injection  All patient's questions were answered to her satisfaction  This note is created using dictation transcription  It may contain typographical errors, grammatical errors, improperly dictated words, background noise and other errors  Subjective:     Patient ID: Sylvia Machuca is a 75 y o  female  Chief Complaint:  76-year-old female status post right knee arthroscopy partial lateral meniscectomy on May 22, 2020  Old pre surgical pain is gone  Denies any locking, catching  She has finished PT  She still notes some discomfort at times in knee, rubber band sensation anterior inferior aspect of knee  Mild swelling at times  She also notes hamstring discomfort  She has returned to gym      Allergy:  No Known Allergies  Medications:  all current active meds have been reviewed  Past Medical History:  Past Medical History:   Diagnosis Date    Arthritis     CPAP (continuous positive airway pressure) dependence     Internal derangement of right knee 3/4/2020    Sleep apnea      Past Surgical History:  Past Surgical History:   Procedure Laterality Date    CLEFT LIP REPAIR      as infant    COLONOSCOPY      HYSTERECTOMY  1999    NE KNEE SCOPE,MED/LAT MENISECTOMY Right 5/22/2020    Procedure: ARTHROSCOPY KNEE, RIGHT PARTIAL LATERAL MENISCECTOMY;  Surgeon: Armida Fernández MD; Location:  MAIN OR;  Service: Orthopedics    TUBAL LIGATION       Family History:  Family History   Problem Relation Age of Onset    Diabetes Father      Social History:  Social History     Substance and Sexual Activity   Alcohol Use Yes    Frequency: Monthly or less    Drinks per session: 1 or 2    Binge frequency: Never    Comment: rare     Social History     Substance and Sexual Activity   Drug Use Never     Social History     Tobacco Use   Smoking Status Never Smoker   Smokeless Tobacco Never Used     Review of Systems   Constitutional: Negative for chills and fever  HENT: Negative for drooling and sneezing  Eyes: Negative for redness  Respiratory: Negative for cough and wheezing  Cardiovascular: Negative for chest pain  Gastrointestinal: Negative for nausea and vomiting  Genitourinary: Negative  Musculoskeletal:        As reviewed in HPI   Skin: Negative for rash  Neurological: Negative  Psychiatric/Behavioral: The patient is not nervous/anxious  Objective:  BP Readings from Last 1 Encounters:   08/11/20 124/70      Wt Readings from Last 1 Encounters:   08/11/20 90 7 kg (200 lb)      BMI:   Estimated body mass index is 31 32 kg/m² as calculated from the following:    Height as of this encounter: 5' 7" (1 702 m)  Weight as of this encounter: 90 7 kg (200 lb)  BSA:   Estimated body surface area is 2 02 meters squared as calculated from the following:    Height as of this encounter: 5' 7" (1 702 m)  Weight as of this encounter: 90 7 kg (200 lb)  Physical Exam  Constitutional:       Appearance: Normal appearance  She is well-developed  HENT:      Head: Normocephalic and atraumatic  Nose: Nose normal    Eyes:      Extraocular Movements: Extraocular movements intact  Conjunctiva/sclera: Conjunctivae normal    Neck:      Musculoskeletal: Neck supple     Pulmonary:      Effort: Pulmonary effort is normal    Musculoskeletal:      Right knee: She exhibits effusion (minimal )  Skin:     General: Skin is warm and dry  Neurological:      Mental Status: She is alert and oriented to person, place, and time  Deep Tendon Reflexes: Reflexes are normal and symmetric  Psychiatric:         Behavior: Behavior normal          Thought Content: Thought content normal          Judgment: Judgment normal        Right Knee Exam     Muscle Strength   The patient has normal right knee strength      Tenderness   Right knee tenderness location: no specific tenderness     Range of Motion   Extension: 0   Flexion: 130     Tests   Ana Luisa:  Medial - negative Lateral - negative  Varus: negative Valgus: negative  Patellar apprehension: negative    Other   Erythema: absent  Scars: present (well healed portal sites )  Sensation: normal  Pulse: present  Swelling: mild  Effusion: effusion (minimal ) present            no new images to review      Scribe Attestation    I,:   Rosa Carlson am acting as a scribe while in the presence of the attending physician :        I,:   Mikhail Mcelroy MD personally performed the services described in this documentation    as scribed in my presence :

## 2020-09-21 ENCOUNTER — TELEPHONE (OUTPATIENT)
Dept: OBGYN CLINIC | Facility: HOSPITAL | Age: 65
End: 2020-09-21

## 2020-09-24 ENCOUNTER — OFFICE VISIT (OUTPATIENT)
Dept: OBGYN CLINIC | Facility: CLINIC | Age: 65
End: 2020-09-24
Payer: MEDICARE

## 2020-09-24 VITALS
BODY MASS INDEX: 31.86 KG/M2 | SYSTOLIC BLOOD PRESSURE: 138 MMHG | TEMPERATURE: 98.1 F | DIASTOLIC BLOOD PRESSURE: 82 MMHG | WEIGHT: 203 LBS | HEIGHT: 67 IN

## 2020-09-24 DIAGNOSIS — M17.11 PRIMARY OSTEOARTHRITIS OF RIGHT KNEE: ICD-10-CM

## 2020-09-24 DIAGNOSIS — Z47.89 AFTERCARE FOLLOWING SURGERY OF THE MUSCULOSKELETAL SYSTEM: Primary | ICD-10-CM

## 2020-09-24 PROCEDURE — 99213 OFFICE O/P EST LOW 20 MIN: CPT | Performed by: PHYSICIAN ASSISTANT

## 2020-09-24 PROCEDURE — 20610 DRAIN/INJ JOINT/BURSA W/O US: CPT | Performed by: PHYSICIAN ASSISTANT

## 2020-09-24 RX ORDER — LIDOCAINE HYDROCHLORIDE 10 MG/ML
7 INJECTION, SOLUTION INFILTRATION; PERINEURAL
Status: COMPLETED | OUTPATIENT
Start: 2020-09-24 | End: 2020-09-24

## 2020-09-24 RX ORDER — BETAMETHASONE SODIUM PHOSPHATE AND BETAMETHASONE ACETATE 3; 3 MG/ML; MG/ML
6 INJECTION, SUSPENSION INTRA-ARTICULAR; INTRALESIONAL; INTRAMUSCULAR; SOFT TISSUE
Status: COMPLETED | OUTPATIENT
Start: 2020-09-24 | End: 2020-09-24

## 2020-09-24 RX ADMIN — BETAMETHASONE SODIUM PHOSPHATE AND BETAMETHASONE ACETATE 6 MG: 3; 3 INJECTION, SUSPENSION INTRA-ARTICULAR; INTRALESIONAL; INTRAMUSCULAR; SOFT TISSUE at 13:30

## 2020-09-24 RX ADMIN — LIDOCAINE HYDROCHLORIDE 7 ML: 10 INJECTION, SOLUTION INFILTRATION; PERINEURAL at 13:30

## 2020-09-24 NOTE — PROGRESS NOTES
Assessment:     1  Aftercare following surgery of the musculoskeletal system    2  Primary osteoarthritis of right knee        Plan:     Problem List Items Addressed This Visit        Musculoskeletal and Integument    Primary osteoarthritis of right knee    Relevant Medications    lidocaine (XYLOCAINE) 1 % injection 7 mL (Completed)    betamethasone acetate-betamethasone sodium phosphate (CELESTONE) injection 6 mg (Completed)    Other Relevant Orders    Large joint arthrocentesis: R knee (Completed)       Other    Aftercare following surgery of the musculoskeletal system - Primary          The patient was seen and examined by Dr Sanjuanita Hollis and myself  Patient is status post right knee arthroscopy with partial lateral meniscectomy with osteoarthritis  Findings and treatment options were discussed with the patient  Discussed that her symptoms at this time or consistent with osteoarthritis  Mechanical symptoms prior to surgery have resolved  Recommend cortisone injection to the right knee joint today  She tolerated the procedure well  Advised to apply cold compress today  Discussed importance of continuing low-impact exercises such as stationary bicycle or swimming  She will follow up as needed if symptoms return  Discussed possible joint supplement injections if there is no relief with cortisone injection  All questions were answered to patient's satisfaction  Subjective:     Patient ID: Kieran Coleman is a 77 y o  female  Chief Complaint:  68-year-old female status post right knee arthroscopy partial lateral meniscectomy on May 22, 2020  Sharp pain prior to surgery has resolved  She returns today due to continued aching pain of the right knee  It is worse with stair climbing and prolonged walking  She denies any increased swelling  She has been doing home exercises with minimal relief  She denies any new injury      Allergy:  No Known Allergies  Medications:  all current active meds have been reviewed  Past Medical History:  Past Medical History:   Diagnosis Date    Arthritis     CPAP (continuous positive airway pressure) dependence     Internal derangement of right knee 3/4/2020    Sleep apnea      Past Surgical History:  Past Surgical History:   Procedure Laterality Date    CLEFT LIP REPAIR      as infant    COLONOSCOPY      HYSTERECTOMY  1999    [place] KNEE SCOPE,MED/LAT MENISECTOMY Right 5/22/2020    Procedure: ARTHROSCOPY KNEE, RIGHT PARTIAL LATERAL MENISCECTOMY;  Surgeon: Vicenta Johnson MD;  Location:  MAIN OR;  Service: Orthopedics    TUBAL LIGATION       Family History:  Family History   Problem Relation Age of Onset    Diabetes Father      Social History:  Social History     Substance and Sexual Activity   Alcohol Use Yes    Frequency: Monthly or less    Drinks per session: 1 or 2    Binge frequency: Never    Comment: rare     Social History     Substance and Sexual Activity   Drug Use Never     Social History     Tobacco Use   Smoking Status Never Smoker   Smokeless Tobacco Never Used     Review of Systems   Constitutional: Negative for chills and fever  HENT: Negative for drooling and sneezing  Eyes: Negative for redness  Respiratory: Negative for cough and wheezing  Cardiovascular: Negative for chest pain  Gastrointestinal: Negative for nausea and vomiting  Genitourinary: Negative  Musculoskeletal: Positive for arthralgias  As reviewed in HPI   Skin: Negative for rash  Neurological: Negative  Psychiatric/Behavioral: The patient is not nervous/anxious  Objective:  BP Readings from Last 1 Encounters:   09/24/20 138/82      Wt Readings from Last 1 Encounters:   09/24/20 92 1 kg (203 lb)      BMI:   Estimated body mass index is 31 79 kg/m² as calculated from the following:    Height as of this encounter: 5' 7" (1 702 m)  Weight as of this encounter: 92 1 kg (203 lb)    BSA:   Estimated body surface area is 2 04 meters squared as calculated from the following:    Height as of this encounter: 5' 7" (1 702 m)  Weight as of this encounter: 92 1 kg (203 lb)  Physical Exam  Constitutional:       Appearance: Normal appearance  She is well-developed  HENT:      Head: Normocephalic and atraumatic  Nose: Nose normal    Eyes:      Extraocular Movements: Extraocular movements intact  Conjunctiva/sclera: Conjunctivae normal    Neck:      Musculoskeletal: Neck supple  Pulmonary:      Effort: Pulmonary effort is normal    Musculoskeletal:      Right knee: She exhibits effusion (trace)  Skin:     General: Skin is warm and dry  Neurological:      Mental Status: She is alert and oriented to person, place, and time  Deep Tendon Reflexes: Reflexes are normal and symmetric  Psychiatric:         Behavior: Behavior normal          Thought Content: Thought content normal          Judgment: Judgment normal        Right Knee Exam     Muscle Strength   The patient has normal right knee strength  Tenderness   The patient is experiencing no tenderness       Range of Motion   Extension: 0   Flexion: 130     Tests   Ana Luisa:  Medial - negative Lateral - negative  Varus: negative Valgus: negative  Patellar apprehension: negative    Other   Erythema: absent  Scars: present (well healed portal sites )  Sensation: normal  Pulse: present  Swelling: mild  Effusion: effusion (trace) present    Comments:  Patellofemoral crepitation            no new images to review      Large joint arthrocentesis: R knee  Date/Time: 9/24/2020 1:30 PM  Consent given by: patient  Site marked: site marked  Timeout: Immediately prior to procedure a time out was called to verify the correct patient, procedure, equipment, support staff and site/side marked as required   Supporting Documentation  Indications: pain   Procedure Details  Location: knee - R knee  Preparation: Patient was prepped and draped in the usual sterile fashion  Needle size: 22 G  Approach: anterolateral  Medications administered: 7 mL lidocaine 1 %; 6 mg betamethasone acetate-betamethasone sodium phosphate 6 (3-3) mg/mL    Patient tolerance: patient tolerated the procedure well with no immediate complications  Dressing:  Sterile dressing applied

## 2020-11-19 ENCOUNTER — OFFICE VISIT (OUTPATIENT)
Dept: PHYSICAL THERAPY | Facility: CLINIC | Age: 65
End: 2020-11-19
Payer: MEDICARE

## 2020-11-19 ENCOUNTER — TRANSCRIBE ORDERS (OUTPATIENT)
Dept: PHYSICAL THERAPY | Facility: CLINIC | Age: 65
End: 2020-11-19

## 2020-11-19 DIAGNOSIS — M17.11 PRIMARY OSTEOARTHRITIS OF RIGHT KNEE: Primary | ICD-10-CM

## 2020-11-19 PROCEDURE — 97162 PT EVAL MOD COMPLEX 30 MIN: CPT | Performed by: PHYSICAL THERAPIST

## 2020-11-23 ENCOUNTER — OFFICE VISIT (OUTPATIENT)
Dept: PHYSICAL THERAPY | Facility: CLINIC | Age: 65
End: 2020-11-23
Payer: MEDICARE

## 2020-11-23 DIAGNOSIS — M17.11 PRIMARY OSTEOARTHRITIS OF RIGHT KNEE: Primary | ICD-10-CM

## 2020-11-23 PROCEDURE — 97140 MANUAL THERAPY 1/> REGIONS: CPT | Performed by: PHYSICAL THERAPIST

## 2020-11-23 PROCEDURE — 97112 NEUROMUSCULAR REEDUCATION: CPT | Performed by: PHYSICAL THERAPIST

## 2020-11-23 PROCEDURE — 97110 THERAPEUTIC EXERCISES: CPT | Performed by: PHYSICAL THERAPIST

## 2020-11-30 ENCOUNTER — OFFICE VISIT (OUTPATIENT)
Dept: PHYSICAL THERAPY | Facility: CLINIC | Age: 65
End: 2020-11-30
Payer: MEDICARE

## 2020-11-30 DIAGNOSIS — M17.11 PRIMARY OSTEOARTHRITIS OF RIGHT KNEE: Primary | ICD-10-CM

## 2020-11-30 PROCEDURE — 97140 MANUAL THERAPY 1/> REGIONS: CPT | Performed by: PHYSICAL THERAPIST

## 2020-11-30 PROCEDURE — 97110 THERAPEUTIC EXERCISES: CPT | Performed by: PHYSICAL THERAPIST

## 2020-12-03 ENCOUNTER — OFFICE VISIT (OUTPATIENT)
Dept: PHYSICAL THERAPY | Facility: CLINIC | Age: 65
End: 2020-12-03
Payer: MEDICARE

## 2020-12-03 DIAGNOSIS — M17.11 PRIMARY OSTEOARTHRITIS OF RIGHT KNEE: Primary | ICD-10-CM

## 2020-12-03 PROCEDURE — 97110 THERAPEUTIC EXERCISES: CPT | Performed by: PHYSICAL THERAPIST

## 2020-12-03 PROCEDURE — 97140 MANUAL THERAPY 1/> REGIONS: CPT | Performed by: PHYSICAL THERAPIST

## 2020-12-03 PROCEDURE — 97112 NEUROMUSCULAR REEDUCATION: CPT | Performed by: PHYSICAL THERAPIST

## 2020-12-07 ENCOUNTER — OFFICE VISIT (OUTPATIENT)
Dept: PHYSICAL THERAPY | Facility: CLINIC | Age: 65
End: 2020-12-07
Payer: MEDICARE

## 2020-12-07 DIAGNOSIS — M17.11 PRIMARY OSTEOARTHRITIS OF RIGHT KNEE: Primary | ICD-10-CM

## 2020-12-07 PROCEDURE — 97110 THERAPEUTIC EXERCISES: CPT | Performed by: PHYSICAL THERAPIST

## 2020-12-07 PROCEDURE — 97112 NEUROMUSCULAR REEDUCATION: CPT | Performed by: PHYSICAL THERAPIST

## 2020-12-07 PROCEDURE — 97140 MANUAL THERAPY 1/> REGIONS: CPT | Performed by: PHYSICAL THERAPIST

## 2020-12-10 ENCOUNTER — OFFICE VISIT (OUTPATIENT)
Dept: PHYSICAL THERAPY | Facility: CLINIC | Age: 65
End: 2020-12-10
Payer: MEDICARE

## 2020-12-10 DIAGNOSIS — M17.11 PRIMARY OSTEOARTHRITIS OF RIGHT KNEE: Primary | ICD-10-CM

## 2020-12-10 PROCEDURE — 97112 NEUROMUSCULAR REEDUCATION: CPT | Performed by: PHYSICAL THERAPIST

## 2020-12-10 PROCEDURE — 97140 MANUAL THERAPY 1/> REGIONS: CPT | Performed by: PHYSICAL THERAPIST

## 2020-12-10 PROCEDURE — 97110 THERAPEUTIC EXERCISES: CPT | Performed by: PHYSICAL THERAPIST

## 2020-12-15 ENCOUNTER — OFFICE VISIT (OUTPATIENT)
Dept: PHYSICAL THERAPY | Facility: CLINIC | Age: 65
End: 2020-12-15
Payer: MEDICARE

## 2020-12-15 DIAGNOSIS — M17.11 PRIMARY OSTEOARTHRITIS OF RIGHT KNEE: Primary | ICD-10-CM

## 2020-12-15 PROCEDURE — 97140 MANUAL THERAPY 1/> REGIONS: CPT | Performed by: PHYSICAL THERAPIST

## 2020-12-15 PROCEDURE — 97112 NEUROMUSCULAR REEDUCATION: CPT | Performed by: PHYSICAL THERAPIST

## 2020-12-15 PROCEDURE — 97110 THERAPEUTIC EXERCISES: CPT | Performed by: PHYSICAL THERAPIST

## 2020-12-17 ENCOUNTER — APPOINTMENT (OUTPATIENT)
Dept: PHYSICAL THERAPY | Facility: CLINIC | Age: 65
End: 2020-12-17
Payer: MEDICARE

## 2020-12-18 ENCOUNTER — OFFICE VISIT (OUTPATIENT)
Dept: PHYSICAL THERAPY | Facility: CLINIC | Age: 65
End: 2020-12-18
Payer: MEDICARE

## 2020-12-18 DIAGNOSIS — M17.11 PRIMARY OSTEOARTHRITIS OF RIGHT KNEE: Primary | ICD-10-CM

## 2020-12-18 PROCEDURE — 97112 NEUROMUSCULAR REEDUCATION: CPT | Performed by: PHYSICAL THERAPIST

## 2020-12-18 PROCEDURE — 97140 MANUAL THERAPY 1/> REGIONS: CPT | Performed by: PHYSICAL THERAPIST

## 2020-12-18 PROCEDURE — 97110 THERAPEUTIC EXERCISES: CPT | Performed by: PHYSICAL THERAPIST

## 2020-12-21 ENCOUNTER — TRANSCRIBE ORDERS (OUTPATIENT)
Dept: PHYSICAL THERAPY | Facility: CLINIC | Age: 65
End: 2020-12-21

## 2020-12-21 ENCOUNTER — OFFICE VISIT (OUTPATIENT)
Dept: PHYSICAL THERAPY | Facility: CLINIC | Age: 65
End: 2020-12-21
Payer: MEDICARE

## 2020-12-21 DIAGNOSIS — M17.11 PRIMARY OSTEOARTHRITIS OF RIGHT KNEE: Primary | ICD-10-CM

## 2020-12-21 PROCEDURE — 97112 NEUROMUSCULAR REEDUCATION: CPT | Performed by: PHYSICAL THERAPIST

## 2020-12-21 PROCEDURE — 97110 THERAPEUTIC EXERCISES: CPT | Performed by: PHYSICAL THERAPIST

## 2021-10-10 ENCOUNTER — OFFICE VISIT (OUTPATIENT)
Dept: URGENT CARE | Facility: CLINIC | Age: 66
End: 2021-10-10
Payer: MEDICARE

## 2021-10-10 VITALS
RESPIRATION RATE: 16 BRPM | TEMPERATURE: 97.3 F | BODY MASS INDEX: 31.32 KG/M2 | OXYGEN SATURATION: 97 % | HEART RATE: 75 BPM | WEIGHT: 200 LBS

## 2021-10-10 DIAGNOSIS — R19.7 DIARRHEA, UNSPECIFIED TYPE: Primary | ICD-10-CM

## 2021-10-10 PROCEDURE — G0463 HOSPITAL OUTPT CLINIC VISIT: HCPCS | Performed by: PHYSICIAN ASSISTANT

## 2021-10-10 PROCEDURE — 99213 OFFICE O/P EST LOW 20 MIN: CPT | Performed by: PHYSICIAN ASSISTANT

## 2021-10-10 RX ORDER — ESCITALOPRAM OXALATE 5 MG/1
5 TABLET ORAL DAILY
COMMUNITY

## 2021-10-10 RX ORDER — MELOXICAM 7.5 MG/1
7.5 TABLET ORAL DAILY
COMMUNITY

## 2021-10-10 RX ORDER — ZINC GLUCONATE 50 MG
50 TABLET ORAL DAILY
COMMUNITY

## 2024-02-27 NOTE — PROGRESS NOTES
Pulmonary Consultation   Dyan Presley 68 y.o. female MRN: [id number]  2/28/2024      Reason for Consultation:  Establish patient care for sleep apnea    Requested by:  Eryn kaplan    Assessment:      1. MARIVEL on CPAP    Diagnosed about 5 years ago, now on Cpap and is compliant with for 3 years. No daytime sleepiness, and can sleep through at least 4 hours a day   Cpap compliance study reviewed: 100% compliance, min pressure 4 max [id number], AHI 4.6. average pressure 7.9. will continue current setting  Will plan to follow up for 6 months for compliance and adjustment    Plan:  - follow up in 6 months for sleep apnea    History of Present Illness   HPI:  Dyan Presley is a 68 y.o. female who has hx of obesity, MARIVEL on Cpap. Presented here for establish care.    Patient was diagnosed with sleep apnea about 5 years ago, initially not tolerating the Cpap machine but for the past 3 years she is using it religiously. She also has improvement for her daytime sleepiness. She has no issue tolerating cpap at night. Recently her previous doctor changed the practice so she wants to establish care with us.     She has no acute complaints. Not with shortness of breath, dyspnea on exertion. She has no other acute issue        Review of Systems   Constitutional:  Negative for activity change and fatigue.   Respiratory:  Negative for cough, chest tightness, shortness of breath, wheezing and stridor.    Cardiovascular:  Negative for chest pain and palpitations.   Gastrointestinal:  Negative for abdominal distention, nausea and vomiting.   Genitourinary:  Negative for dysuria.   Neurological:  Negative for weakness and headaches.       Historical Information   Past Medical History:   Diagnosis Date    Anxiety     Arthritis     CPAP (continuous positive airway pressure) dependence     Internal derangement of right knee 3/4/2020    Sleep apnea      Past Surgical History:   Procedure Laterality Date    CLEFT LIP REPAIR      as infant     COLONOSCOPY      HYSTERECTOMY  1999    AK ARTHRS KNE SURG W/MENISCECTOMY MED/LAT W/SHVG Right 5/22/2020    Procedure: ARTHROSCOPY KNEE, RIGHT PARTIAL LATERAL MENISCECTOMY;  Surgeon: Carlito Zabala MD;  Location:  MAIN OR;  Service: Orthopedics    TUBAL LIGATION       Family History   Problem Relation Age of Onset    Diabetes Father          Social History: never smoker  Social History     Tobacco Use   Smoking Status Never   Smokeless Tobacco Never       Meds/Allergies     Current Outpatient Medications:     aspirin 81 MG tablet, Take 325 mg by mouth daily , Disp: , Rfl:     Calcium Carb-Cholecalciferol (CALTRATE 600+D3 PO), Take by mouth 2 (two) times a day , Disp: , Rfl:     escitalopram (LEXAPRO) 5 mg tablet, Take 5 mg by mouth daily, Disp: , Rfl:     Glucosamine-Chondroitin-Ca-D3 (TRIPLE FLEX 50+ PO), Take by mouth daily , Disp: , Rfl:     Lutein-Zeaxanthin 25-5 MG CAPS, Take by mouth daily , Disp: , Rfl:     meloxicam (MOBIC) 7.5 mg tablet, Take 7.5 mg by mouth daily, Disp: , Rfl:     Multiple Vitamins-Minerals (CENTRUM SILVER PO), Take by mouth daily , Disp: , Rfl:     Vitamin E 180 MG CAPS, Take by mouth daily , Disp: , Rfl:     zinc gluconate 50 mg tablet, Take 50 mg by mouth daily, Disp: , Rfl:     Flaxseed, Linseed, (FLAXSEED OIL) 1200 MG CAPS, Take by mouth daily  (Patient not taking: Reported on 10/10/2021), Disp: , Rfl:     Magnesium 500 MG CAPS, Take by mouth daily  (Patient not taking: Reported on 10/10/2021), Disp: , Rfl:   No Known Allergies    Vitals: Blood pressure 120/64, pulse 70, temperature (!) 97.1 °F (36.2 °C), temperature source Tympanic, weight 93.9 kg (207 lb), SpO2 95%., Body mass index is 32.42 kg/m². Oxygen Therapy  SpO2: 95 %  Oxygen Therapy: None (Room air)    Physical Exam  Physical Exam  Constitutional:       Appearance: She is not ill-appearing.   Cardiovascular:      Rate and Rhythm: Normal rate and regular rhythm.      Pulses: Normal pulses.      Heart sounds: Normal heart  "sounds.   Pulmonary:      Effort: Pulmonary effort is normal. No respiratory distress.      Breath sounds: Normal breath sounds.   Abdominal:      General: Abdomen is flat.      Palpations: Abdomen is soft.   Musculoskeletal:      Right lower leg: No edema.      Left lower leg: No edema.   Skin:     General: Skin is warm.   Neurological:      General: No focal deficit present.      Mental Status: She is alert and oriented to person, place, and time.         Labs: I have personally reviewed pertinent lab results.  Lab Results   Component Value Date    WBC 5.02 05/18/2020    HGB 13.4 05/18/2020    HCT 40.4 05/18/2020    MCV 89 05/18/2020     05/18/2020     Lab Results   Component Value Date    CALCIUM 8.9 05/18/2020    K 3.8 05/18/2020    CO2 30 05/18/2020     05/18/2020    BUN 11 05/18/2020    CREATININE 0.67 05/18/2020     No results found for: \"IGE\"  Lab Results   Component Value Date    ALT 28 05/18/2020    AST 21 05/18/2020    ALKPHOS 51 05/18/2020         Imaging and other studies: not on file      Pulmonary function testing: not on file          Disclaimer: Portions of the record may have been created with voice recognition software. Occasional wrong word or \"sound a like\" substitutions may have occurred due to the inherent limitations of voice recognition software. Careful consideration should be taken to recognize, using context, where substitutions have occurred.    Adonay Zabala  Pulmonary & Critical Care Medicine Fellow PGY-IV  [facility]  "

## 2024-02-28 ENCOUNTER — OFFICE VISIT (OUTPATIENT)
Dept: PULMONOLOGY | Facility: CLINIC | Age: 69
End: 2024-02-28

## 2024-02-28 VITALS
TEMPERATURE: 97.1 F | DIASTOLIC BLOOD PRESSURE: 64 MMHG | WEIGHT: 207 LBS | BODY MASS INDEX: 32.42 KG/M2 | HEART RATE: 70 BPM | OXYGEN SATURATION: 95 % | SYSTOLIC BLOOD PRESSURE: 120 MMHG

## 2024-02-28 DIAGNOSIS — G47.33 OSA ON CPAP: Primary | ICD-10-CM

## 2024-07-19 ENCOUNTER — TELEPHONE (OUTPATIENT)
Dept: PULMONOLOGY | Facility: CLINIC | Age: 69
End: 2024-07-19

## 2024-07-19 NOTE — TELEPHONE ENCOUNTER
Called patient to schedule appointment for the 6M FU from the  Recall List and left a voicemail message.

## 2024-09-09 ENCOUNTER — OFFICE VISIT (OUTPATIENT)
Age: 69
End: 2024-09-09
Payer: MEDICARE

## 2024-09-09 VITALS
DIASTOLIC BLOOD PRESSURE: 78 MMHG | BODY MASS INDEX: 32.33 KG/M2 | HEIGHT: 67 IN | WEIGHT: 206 LBS | TEMPERATURE: 97.7 F | OXYGEN SATURATION: 97 % | HEART RATE: 69 BPM | SYSTOLIC BLOOD PRESSURE: 134 MMHG

## 2024-09-09 DIAGNOSIS — E66.9 OBESITY, UNSPECIFIED CLASSIFICATION, UNSPECIFIED OBESITY TYPE, UNSPECIFIED WHETHER SERIOUS COMORBIDITY PRESENT: ICD-10-CM

## 2024-09-09 DIAGNOSIS — G47.33 OSA (OBSTRUCTIVE SLEEP APNEA): Primary | ICD-10-CM

## 2024-09-09 PROCEDURE — 99213 OFFICE O/P EST LOW 20 MIN: CPT | Performed by: INTERNAL MEDICINE

## 2024-09-09 NOTE — PROGRESS NOTES
"Pulmonary Follow Up Note  Dyan Presley 69 y.o. female MRN: [id number]  9/9/2024      HPI:    Patient denies shortness of breath or cough.  No fevers or chills.  No dizziness or lightheadedness.  Endorses better quality sleep since using CPAP.    Exercise Tolerance: Good.  No gross exercise intolerance       Meds:  No inhaled medications    ROS:  Constitutional: - Fatigue, - chills, - fever, - weight change.   HEENT: - rhinorrhea, - sneezing, - sore throat.    Respiratory: - cough, - shortness of breath, - wheezing.    Cardiovascular: - chest pain,  -palpitations, - leg swelling.   Gastrointestinal: - abdominal pain, - constipation, - diarrhea, - nausea, - vomiting.   Endocrine: - cold intolerance, - heat intolerance.   Genitourinary: - dysuria.   Musculoskeletal: - arthralgias.   Skin:- rash, - wound.   Allergic/Immunologic: - allergies  Neurological: - dizziness, - numbness        Vitals: Blood pressure 134/78, pulse 69, temperature 97.7 °F (36.5 °C), temperature source Tympanic, height 5' 7\" (1.702 m), weight 93.4 kg (206 lb), SpO2 97%., Body mass index is 32.26 kg/m².    Physical Exam:  GEN  NAD  HEENT  ncat, non icteric, MM moist  NECK  supple, no JVD, no LAD  CV  +s1s2, no mrg, RRR  PULM  CTA BL, no wrr  ABD  soft, nt, obese abdomen, + BS  EXT  no edema, no cyanosis, no clubbing  NEURO  Aox3, no focal weakness      Assessment:  MARIVEL  Obesity    Plan:  Consult sleep medicine  Patient is compliant with CPAP device.  Continue using at current settings.      Return visit with pulmonary if requested by patient's primary care physician.    Note: Portions of the record may have been created with voice recognition software. Occasional wrong word or \"sound a like\" substitutions may have occurred due to the inherent limitations of voice recognition software. Read the chart carefully and recognize, using context, where substitutions have occurred.     Cristopher Coto M.D.  [facility]  "

## 2024-09-13 ENCOUNTER — HOSPITAL ENCOUNTER (OUTPATIENT)
Dept: HOSPITAL 99 - WDC | Age: 69
End: 2024-09-13
Payer: MEDICARE

## 2024-09-13 DIAGNOSIS — Z12.31: Primary | ICD-10-CM

## 2025-02-18 ENCOUNTER — OFFICE VISIT (OUTPATIENT)
Dept: SLEEP CENTER | Facility: HOSPITAL | Age: 70
End: 2025-02-18
Attending: INTERNAL MEDICINE
Payer: MEDICARE

## 2025-02-18 VITALS
HEIGHT: 67 IN | WEIGHT: 203 LBS | OXYGEN SATURATION: 98 % | RESPIRATION RATE: 16 BRPM | SYSTOLIC BLOOD PRESSURE: 130 MMHG | DIASTOLIC BLOOD PRESSURE: 70 MMHG | BODY MASS INDEX: 31.86 KG/M2

## 2025-02-18 DIAGNOSIS — G47.33 OSA (OBSTRUCTIVE SLEEP APNEA): Primary | ICD-10-CM

## 2025-02-18 DIAGNOSIS — E66.9 OBESITY (BMI 30-39.9): ICD-10-CM

## 2025-02-18 DIAGNOSIS — F45.8 BRUXISM: ICD-10-CM

## 2025-02-18 PROCEDURE — G2211 COMPLEX E/M VISIT ADD ON: HCPCS | Performed by: INTERNAL MEDICINE

## 2025-02-18 PROCEDURE — 99204 OFFICE O/P NEW MOD 45 MIN: CPT | Performed by: INTERNAL MEDICINE

## 2025-02-18 NOTE — PROGRESS NOTES
Name: Dyan Presley      : 1955      MRN: [id number]  Encounter Provider: Jori Nash MD  Encounter Date: 2025   Encounter department[facility]  :  Assessment & Plan  MARIVEL (obstructive sleep apnea)    Orders:    Ambulatory Referral to Sleep Medicine    PAP DME Resupply/Reorder    PAP DME Pressure Change    Bruxism         Obesity (BMI 30-39.9)          PLAN:   Problems & Comorbidities Addressed this Visit as listed.  Stable/controlled conditions reviewed in notes.  I reviewed results of prior studies and physiologic data with the patient.   I discussed treatment options with risks and benefits.  Treatment with  PAP is medically necessary and Dyna is agreable to continue use.   Care of equipment, methods to improve comfort using PAP and importance of compliance with therapy were discussed.  Pressure setting: change 9-12 cmH2O using a fullface mask.  Prefers a standard house  Rx provided to replace supplies and Care coordinated with DME provider.   Discussed strategies for weight reduction.    Follow-up is advised in 1 year or sooner if needed to monitor progress, compliance and to adjust therapy.      History of Present Illness   HPI        Consultation - Sleep Center   Dyan Presley  69 y.o. female  :1955  MRN:[id number]  DOS:2025    Physician Requesting Consult: Cristopher Coto MD             Reason for Consult : At your kind request I saw Dyan Presley for initial sleep evaluation today.  She was diagnosed with obstructive sleep apnea around  and using CPAP since.  Previous physician office close and she is here to establish care.    There were no results of prior studies available.    PFSH, Problem List, Medications & Allergies were reviewed in EMR.    Dyan  has a past medical history of Anxiety, Arthritis, CPAP (continuous positive airway pressure) dependence, Internal derangement of right knee (3/4/2020), and Sleep apnea.      She has a current  "medication list which includes the following prescription(s): aspirin, calcium carb-cholecalciferol, escitalopram, flaxseed oil, glucosamine-chondroitin-ca-d3, magnesium, meloxicam, multiple vitamins-minerals, vitamin e, zinc gluconate, and lutein-zeaxanthin.      HPI: She is using a ResMed machine that is approximately a-year-old and benefits from use.  She is experiencing no adverse effects.  Compliance data shows use for more than 4 hours 97% of the time.  Respiratory event index is 6.2/h at 95th percentile pressure of 11.3 cm H2O.  Other complaints: None. Restless Leg Syndrome:  reports no suggestive symptoms.  .  Parasomnia:  reports teeth grinding during sleep for which she uses a dental guard;   Sleep Routine (averaged): Typical Bedtime: (Patient-Rptd) 9:00 pm.  Gets OOB: (Patient-Rptd) 6:00 am. TIB:9 hrs.   Sleep latency:< 15 minutes; Sleep Interruptions: 2-, because of nocturia and able to fall back asleep. Awakens: Needing an alarm  Upon awakening: usually feels refreshed.  She estimates getting hrs sleep.  Daytime Function:Dyan denies excessive daytime sleepiness. She rated herself at Total score: (Patient-Rptd) 4 /24 on the Drummonds Sleepiness Scale.     Habits:   reports that she has never smoked. She has never used smokeless tobacco.;  reports current alcohol use.; Reports no history of drug use.;  E-Cigarette/Vaping  Never User; Caffeine use:very little until  ; Exercise routine: regular.    Occupation: (Patient-Rptd) Retired [employer] License: (Patient-Rptd) No  Family History: Negative for sleep disturbance.  ROS: Significant for weight has been stable.  Review of systems was otherwise negative..     EXAM:  /70   Resp 16   Ht 5' 7\" (1.702 m)   Wt 92.1 kg (203 lb)   SpO2 98%   BMI 31.79 kg/m²    General: Well groomed female, well appearing, in no apparent distress.   Neurological: Alert and orientated; cooperative; Cranial nerves intact;    Psychiatric: Speech: Clear and coherent; normal mood, " "affect & thought   Skin: Warm and dry; Color& Hydration good; no facial rashes or lesions   HEENT:  Craniofacial anatomy: normal Sinuses: Non-tender. TMJ: Normal    Eyes: EOM's intact; conjunctiva/corneas clear   Ears: Appear normal     Nasal Airway: assymetric nares Septum: Intact; Turbinates: Normal; Rhinorrhea: None  Mouth: Lips: Normal posture; Dentition: worn down. Mucosa: Moist; Hard Palate:normal    Oropharryx: crowded and AP narrowing Tongue: Mallampati:Class IV and MobileSoft Palate:  redundant  Tonsils: absent  Neck:; [no abnormal masses; Supple; no abnormal masses; Thyroid: Normal. Trachea: Central.    Heart: S1,S2 normal; RRR; no gallop; no murmur   Lungs: Respiratory Effort: Normal. Air entry good bilaterally.  No wheezes.  No rales  Abdomen: Obese, soft.   Extremities: No pedal edema.  No clubbing or cyanosis.    Musculoskeletal:  Motor normal; Gait: Normal.       Sincerely,      Authenticated electronically on 02/18/25   Board Certified Specialist     Portions of the record may have been created with voice recognition software. Occasional wrong word or \"sound a like\" substitutions may have occurred due to the inherent limitations of voice recognition software. There may also be notations and random deletions of words or characters from malfunctioning software. Read the chart carefully and recognize, using context, where substitutions/deletions have occurred.       Sitting and reading: (Patient-Rptd) Slight chance of dozing  Watching TV: (Patient-Rptd) Slight chance of dozing  Sitting, inactive in a public place (e.g. a theatre or a meeting): (Patient-Rptd) Would never doze  As a passenger in a car for an hour without a break: (Patient-Rptd) Would never doze  Lying down to rest in the afternoon when circumstances permit: (Patient-Rptd) Moderate chance of dozing  Sitting and talking to someone: (Patient-Rptd) Would never doze  Sitting quietly after a lunch without alcohol: (Patient-Rptd) Would never " "doze  In a car, while stopped for a few minutes in traffic: (Patient-Rptd) Would never doze  Total score: (Patient-Rptd) 4     Review of Systems  Pertinent positives/negatives included in HPI and also as noted:       Objective   /70   Resp 16   Ht 5' 7\" (1.702 m)   Wt 92.1 kg (203 lb)   SpO2 98%   BMI 31.79 kg/m²        Physical Exam    Data  Lab Results   Component Value Date    HGB 13.4 05/18/2020    HCT 40.4 05/18/2020    MCV 89 05/18/2020      Lab Results   Component Value Date    CALCIUM 8.9 05/18/2020    K 3.8 05/18/2020    CO2 30 05/18/2020     05/18/2020    BUN 11 05/18/2020    CREATININE 0.67 05/18/2020     No results found for: \"IRON\", \"TIBC\", \"FERRITIN\"  Lab Results   Component Value Date    AST 21 05/18/2020    ALT 28 05/18/2020         "

## 2025-02-18 NOTE — ASSESSMENT & PLAN NOTE
Orders:    Ambulatory Referral to Sleep Medicine    PAP DME Resupply/Reorder    PAP DME Pressure Change

## 2025-02-19 ENCOUNTER — TELEPHONE (OUTPATIENT)
Dept: SLEEP CENTER | Facility: CLINIC | Age: 70
End: 2025-02-19

## 2025-08-08 PROBLEM — F41.9 ANXIETY DISORDER OF CHILDHOOD OR ADOLESCENCE: Status: ACTIVE | Noted: 2025-08-08

## 2025-08-08 PROBLEM — R37 SEXUAL DYSFUNCTION: Status: ACTIVE | Noted: 2025-08-08

## 2025-08-08 PROBLEM — E66.9 OBESITY, UNSPECIFIED: Status: ACTIVE | Noted: 2025-08-08

## 2025-08-08 PROBLEM — G47.00 INSOMNIA WITH SLEEP APNEA: Status: ACTIVE | Noted: 2025-08-08

## 2025-08-08 PROBLEM — R19.7 DIARRHEA: Status: ACTIVE | Noted: 2025-08-08

## 2025-08-08 PROBLEM — G47.30 INSOMNIA WITH SLEEP APNEA: Status: ACTIVE | Noted: 2025-08-08

## 2025-08-08 PROBLEM — G56.03 CARPAL TUNNEL SYNDROME, BILATERAL: Status: ACTIVE | Noted: 2025-08-08

## 2025-08-08 PROBLEM — M54.50 LUMBAGO: Status: ACTIVE | Noted: 2025-08-08

## 2025-08-08 PROBLEM — F33.42 MAJOR DEPRESSIVE DISORDER, RECURRENT EPISODE, IN FULL REMISSION (HCC): Status: ACTIVE | Noted: 2025-08-08

## 2025-08-08 PROBLEM — R53.83 FATIGUE: Status: ACTIVE | Noted: 2025-08-08

## 2025-08-08 PROBLEM — M17.0 PRIMARY OSTEOARTHRITIS OF BOTH KNEES: Status: ACTIVE | Noted: 2025-08-08

## 2025-08-08 PROBLEM — R03.0 ELEVATED BLOOD PRESSURE READING WITHOUT DIAGNOSIS OF HYPERTENSION: Status: ACTIVE | Noted: 2025-08-08

## 2025-08-18 ENCOUNTER — TELEPHONE (OUTPATIENT)
Age: 70
End: 2025-08-18

## 2025-08-20 ENCOUNTER — TELEPHONE (OUTPATIENT)
Dept: SLEEP CENTER | Facility: HOSPITAL | Age: 70
End: 2025-08-20

## (undated) DEVICE — GLOVE SRG BIOGEL 7.5

## (undated) DEVICE — IMPERVIOUS STOCKINETTE: Brand: DEROYAL

## (undated) DEVICE — TUBING SUCTION 5MM X 12 FT

## (undated) DEVICE — NEEDLE 18 G X 1 1/2

## (undated) DEVICE — CAST PADDING 6 IN STERILE

## (undated) DEVICE — SYRINGE 3ML LL

## (undated) DEVICE — OCCLUSIVE GAUZE STRIP,3% BISMUTH TRIBROMOPHENATE IN PETROLATUM BLEND: Brand: XEROFORM

## (undated) DEVICE — BETHLEHEM UNIVERSAL  ARTHRO PK: Brand: CARDINAL HEALTH

## (undated) DEVICE — GLOVE SRG BIOGEL ORTHOPEDIC 7.5

## (undated) DEVICE — CHLORAPREP HI-LITE 26ML ORANGE

## (undated) DEVICE — PENCIL ELECTROSURG E-Z CLEAN -0035H

## (undated) DEVICE — BLADE SHAVER EXCALIBUR 4MM 13CM COOLCUT

## (undated) DEVICE — TUBING ARTHROSCOPIC WAVE  MAIN PUMP

## (undated) DEVICE — 3000CC GUARDIAN II: Brand: GUARDIAN

## (undated) DEVICE — GLOVE INDICATOR PI UNDERGLOVE SZ 8 BLUE

## (undated) DEVICE — FILTER STRAW 1.7

## (undated) DEVICE — SYRINGE 30ML LL

## (undated) DEVICE — ACE WRAP 6 IN UNSTERILE

## (undated) DEVICE — SUT ETHILON 3-0 PS-1 18 IN 1663H

## (undated) DEVICE — NEEDLE HYPO 22G X 1-1/2 IN

## (undated) DEVICE — INTENDED FOR TISSUE SEPARATION, AND OTHER PROCEDURES THAT REQUIRE A SHARP SURGICAL BLADE TO PUNCTURE OR CUT.: Brand: BARD-PARKER SAFETY BLADES SIZE 11, STERILE

## (undated) DEVICE — 2000CC GUARDIAN II: Brand: GUARDIAN

## (undated) DEVICE — VIAL DECANTER